# Patient Record
Sex: FEMALE | Race: WHITE | Employment: UNEMPLOYED | URBAN - METROPOLITAN AREA
[De-identification: names, ages, dates, MRNs, and addresses within clinical notes are randomized per-mention and may not be internally consistent; named-entity substitution may affect disease eponyms.]

---

## 2017-01-05 ENCOUNTER — OFFICE VISIT (OUTPATIENT)
Dept: ORTHOPEDIC SURGERY | Age: 62
End: 2017-01-05

## 2017-01-05 VITALS
HEART RATE: 89 BPM | WEIGHT: 240.08 LBS | SYSTOLIC BLOOD PRESSURE: 162 MMHG | BODY MASS INDEX: 34.45 KG/M2 | DIASTOLIC BLOOD PRESSURE: 90 MMHG

## 2017-01-05 DIAGNOSIS — M47.812 CERVICAL SPONDYLOSIS WITHOUT MYELOPATHY: Primary | ICD-10-CM

## 2017-01-05 PROCEDURE — 99213 OFFICE O/P EST LOW 20 MIN: CPT | Performed by: PHYSICAL MEDICINE & REHABILITATION

## 2017-01-05 RX ORDER — MELOXICAM 15 MG/1
15 TABLET ORAL DAILY
Qty: 30 TABLET | Refills: 3 | Status: SHIPPED | OUTPATIENT
Start: 2017-01-05 | End: 2018-10-01 | Stop reason: ALTCHOICE

## 2017-02-21 RX ORDER — GABAPENTIN 300 MG/1
CAPSULE ORAL
Qty: 360 CAPSULE | Refills: 0 | Status: SHIPPED | OUTPATIENT
Start: 2017-02-21 | End: 2017-06-29 | Stop reason: SDUPTHER

## 2017-04-07 ENCOUNTER — HOSPITAL ENCOUNTER (OUTPATIENT)
Dept: ULTRASOUND IMAGING | Age: 62
Discharge: HOME | End: 2017-04-07
Attending: INTERNAL MEDICINE | Admitting: INTERNAL MEDICINE

## 2017-04-07 DIAGNOSIS — E04.2 MULTIPLE THYROID NODULES: ICD-10-CM

## 2017-04-07 DIAGNOSIS — E04.2 NONTOXIC MULTINODULAR GOITER: ICD-10-CM

## 2017-04-18 ENCOUNTER — TELEPHONE (OUTPATIENT)
Dept: ENDOCRINOLOGY | Age: 62
End: 2017-04-18

## 2017-04-18 DIAGNOSIS — E04.2 MULTIPLE THYROID NODULES: Primary | ICD-10-CM

## 2017-05-15 ENCOUNTER — OFFICE VISIT (OUTPATIENT)
Dept: DERMATOLOGY | Age: 62
End: 2017-05-15

## 2017-05-15 DIAGNOSIS — L82.1 SEBORRHEIC KERATOSES: Primary | ICD-10-CM

## 2017-05-15 DIAGNOSIS — Z41.1 ELECTIVE PROCEDURE FOR UNACCEPTABLE COSMETIC APPEARANCE: ICD-10-CM

## 2017-05-15 DIAGNOSIS — D23.39: ICD-10-CM

## 2017-05-15 DIAGNOSIS — Z12.83 SCREENING EXAM FOR SKIN CANCER: ICD-10-CM

## 2017-05-15 PROCEDURE — 1711000 COSMETIC-DESTRUCT B9 LESION 1-14: Performed by: DERMATOLOGY

## 2017-05-15 PROCEDURE — 99203 OFFICE O/P NEW LOW 30 MIN: CPT | Performed by: DERMATOLOGY

## 2017-05-15 PROCEDURE — 1131000 COSMETIC-SHAVE SKIN LESION 0.5 CM/<: Performed by: DERMATOLOGY

## 2017-05-18 ENCOUNTER — TELEPHONE (OUTPATIENT)
Dept: DERMATOLOGY | Age: 62
End: 2017-05-18

## 2017-06-29 RX ORDER — GABAPENTIN 300 MG/1
CAPSULE ORAL
Qty: 360 CAPSULE | Refills: 0 | Status: SHIPPED | OUTPATIENT
Start: 2017-06-29 | End: 2018-10-01 | Stop reason: SDUPTHER

## 2017-07-28 ENCOUNTER — OFFICE VISIT (OUTPATIENT)
Dept: VASCULAR SURGERY | Age: 62
End: 2017-07-28

## 2017-07-28 VITALS
DIASTOLIC BLOOD PRESSURE: 84 MMHG | WEIGHT: 234 LBS | SYSTOLIC BLOOD PRESSURE: 131 MMHG | HEART RATE: 83 BPM | BODY MASS INDEX: 33.5 KG/M2 | HEIGHT: 70 IN

## 2017-07-28 DIAGNOSIS — I83.93 ASYMPTOMATIC VARICOSE VEINS, BILATERAL: Primary | ICD-10-CM

## 2017-07-28 DIAGNOSIS — I83.93 SPIDER VEINS OF BOTH LOWER EXTREMITIES: ICD-10-CM

## 2017-07-28 PROCEDURE — 99203 OFFICE O/P NEW LOW 30 MIN: CPT | Performed by: SURGERY

## 2017-07-28 ASSESSMENT — ENCOUNTER SYMPTOMS
GASTROINTESTINAL NEGATIVE: 1
ALLERGIC/IMMUNOLOGIC NEGATIVE: 1
RESPIRATORY NEGATIVE: 1
EYES NEGATIVE: 1

## 2017-10-09 RX ORDER — GABAPENTIN 300 MG/1
CAPSULE ORAL
Qty: 360 CAPSULE | Refills: 0 | Status: SHIPPED | OUTPATIENT
Start: 2017-10-09 | End: 2017-11-10 | Stop reason: SDUPTHER

## 2017-10-19 ENCOUNTER — HOSPITAL ENCOUNTER (OUTPATIENT)
Dept: ULTRASOUND IMAGING | Age: 62
Discharge: HOME | End: 2017-10-19

## 2017-10-19 DIAGNOSIS — E04.2 AUTOSOMAL DOMINANT MULTINODULAR GOITER ASSOCIATED WITH MUTATION IN DICER1 GENE: ICD-10-CM

## 2017-11-10 ENCOUNTER — OFFICE VISIT (OUTPATIENT)
Dept: ENDOCRINOLOGY | Age: 62
End: 2017-11-10

## 2017-11-10 VITALS
SYSTOLIC BLOOD PRESSURE: 134 MMHG | HEART RATE: 88 BPM | OXYGEN SATURATION: 96 % | WEIGHT: 243 LBS | HEIGHT: 70 IN | RESPIRATION RATE: 16 BRPM | BODY MASS INDEX: 34.79 KG/M2 | DIASTOLIC BLOOD PRESSURE: 92 MMHG

## 2017-11-10 DIAGNOSIS — E04.2 MULTIPLE THYROID NODULES: Primary | ICD-10-CM

## 2017-11-10 PROCEDURE — 99213 OFFICE O/P EST LOW 20 MIN: CPT | Performed by: INTERNAL MEDICINE

## 2017-11-10 RX ORDER — LANOLIN ALCOHOL/MO/W.PET/CERES
10 CREAM (GRAM) TOPICAL DAILY
COMMUNITY

## 2017-11-10 ASSESSMENT — ENCOUNTER SYMPTOMS
BACK PAIN: 0
PHOTOPHOBIA: 0
HEMOPTYSIS: 0
ORTHOPNEA: 0
DOUBLE VISION: 0
COUGH: 0
BLURRED VISION: 0

## 2017-11-10 NOTE — PROGRESS NOTES
Endocrinology  Clint Gracia M.D. Phone: [phone]   FAX: [phone]       Dhruv Gresham   YOB: 1955    Date of Visit:  11/10/2017    Allergies   Allergen Reactions    Penicillins Hives     Outpatient Prescriptions Marked as Taking for the 11/10/17 encounter (Office Visit) with Rishi Fisher MD   Medication Sig Dispense Refill    melatonin 3 MG TABS tablet Take 3 mg by mouth daily Take one half tablet nightly      gabapentin (NEURONTIN) 300 MG capsule TAKE ONE CAPSULE BY MOUTH FOUR TIMES A  capsule 0    meloxicam (MOBIC) 15 MG tablet Take 1 tablet by mouth daily 30 tablet 3    magnesium (MAGNESIUM-OXIDE) 250 MG TABS tablet Take 250 mg by mouth daily      Calcium Carbonate-Vit D-Min 2164-6021 MG-UNIT CHEW Take 1,200 mg by mouth daily. Vitals:    11/10/17 1022 11/10/17 1028   BP: (!) 152/92 (!) 134/92   Site: Right Arm Right Arm   Position: Sitting Sitting   Cuff Size: Large Adult Large Adult   Pulse: 88    Resp: 16    SpO2: 96%    Weight: 243 lb (110.2 kg)    Height: 5' 10\" (1.778 m)      Body mass index is 34.87 kg/m².      Wt Readings from Last 3 Encounters:   11/10/17 243 lb (110.2 kg)   07/28/17 234 lb (106.1 kg)   01/05/17 240 lb 1.3 oz (108.9 kg)     BP Readings from Last 3 Encounters:   11/10/17 (!) 134/92   07/28/17 131/84   01/05/17 (!) 162/90        Past Medical History:   Diagnosis Date    Arthritis     Hip pain     Hypertension     IBS (irritable bowel syndrome)     Neuropathy (HCC)     Pulmonary embolism (HCC)     Recurrent low back pain     Thyroid disease     enlarged     Past Surgical History:   Procedure Laterality Date    COLONOSCOPY  10/26/2016    Polyps    FOOT SURGERY Left      Family History   Problem Relation Age of Onset    Cancer Father     Heart Disease Sister     Other Sister      Pulmonary Embolism     History   Smoking Status    Never Smoker   Smokeless Tobacco    Never Used      History   Alcohol Use No MARIE Barrett is a 64 y.o. female who is here for a follow-up for management of thyroid nodule  PCP :  Bee Handy MD     Patient has a PMH of hypertension, PE, back pain, neuropathy. She had an MRI in 01/16 which showed thyroid nodules. Patient had Thyroid US done in 02/16 which showed the thyroid gland to be heterogenous with a nodule in left upper lobe. No obstructive symptoms      Mother had hypothyroidism. No FH of thyroid cancer  No History of exposure to radiation as a child. Review of Systems   Constitutional: Negative for chills, diaphoresis, fever, malaise/fatigue and weight loss. Eyes: Negative for blurred vision, double vision and photophobia. Respiratory: Negative for cough and hemoptysis. Cardiovascular: Negative for chest pain, palpitations and orthopnea. Genitourinary: Negative for dysuria, flank pain, frequency, hematuria and urgency. Musculoskeletal: Negative for back pain, falls, joint pain, myalgias and neck pain. Skin: Negative for itching and rash. Neurological: Negative for dizziness, tingling, tremors, sensory change, speech change, focal weakness, seizures, loss of consciousness and headaches. Endo/Heme/Allergies: Negative for environmental allergies and polydipsia. Does not bruise/bleed easily. Psychiatric/Behavioral: Negative for depression, hallucinations, memory loss, substance abuse and suicidal ideas. The patient is not nervous/anxious and does not have insomnia. Physical Exam   Constitutional: She is oriented to person, place, and time. She appears well-developed. No distress. HENT:   Mouth/Throat: Oropharynx is clear and moist.   Eyes: EOM are normal.   Neck: Thyromegaly present. Cardiovascular: Normal rate and normal heart sounds. Pulmonary/Chest: Effort normal. No respiratory distress. She has no wheezes. Abdominal: Soft. Bowel sounds are normal. There is no tenderness.    Musculoskeletal: She exhibits Impression   Enlarged heterogeneous thyroid.  Few slightly more focal nodular appearing   areas are seen within both lobes of thyroid which are overall similar in   appearance compared to prior.           Assessment/Plan    1. Thyroid nodule    This 64 yrs old female was found to have a 1.7 cm nodule in left lobe. Reviewed US images. Both lobes heterogenous. Cystic nodule 6-7 mm in right lobe. Left lobe showed 1.7 cm nodule    S/p FNA in 04/16 which showed the nodules to be benign hyperplastic nodule    US in 04/17 showed a left sided nodule arising from lower pole ( 3.5 Cm)    Most recent [address] in 10/17 did not show the large nodule seen previously. Nodules are stable. Will continue to monitor the nodules for now     TSH, Free T4 and antibodies were normal in 02/16     Repeat US in 12 months       2. PE On coumadin. 3. High BP . Previously normal. Advised to follow-up with her PCP.

## 2018-10-01 ENCOUNTER — OFFICE VISIT (OUTPATIENT)
Dept: FAMILY MEDICINE CLINIC | Age: 63
End: 2018-10-01

## 2018-10-01 VITALS
DIASTOLIC BLOOD PRESSURE: 82 MMHG | BODY MASS INDEX: 33 KG/M2 | WEIGHT: 230 LBS | SYSTOLIC BLOOD PRESSURE: 130 MMHG | OXYGEN SATURATION: 98 % | HEART RATE: 77 BPM

## 2018-10-01 DIAGNOSIS — M50.920 CERVICAL DISC DISORDER OF MID-CERVICAL REGION: ICD-10-CM

## 2018-10-01 DIAGNOSIS — Z13.220 SCREENING FOR LIPID DISORDERS: ICD-10-CM

## 2018-10-01 DIAGNOSIS — Z86.711 HISTORY OF PULMONARY EMBOLISM: ICD-10-CM

## 2018-10-01 DIAGNOSIS — Z11.59 ENCOUNTER FOR HEPATITIS C SCREENING TEST FOR LOW RISK PATIENT: ICD-10-CM

## 2018-10-01 DIAGNOSIS — G62.9 POLYNEUROPATHY: Primary | ICD-10-CM

## 2018-10-01 DIAGNOSIS — H93.13 TINNITUS, BILATERAL: ICD-10-CM

## 2018-10-01 DIAGNOSIS — Z23 NEED FOR INFLUENZA VACCINATION: ICD-10-CM

## 2018-10-01 LAB
A/G RATIO: 1.6 (ref 1.1–2.2)
ALBUMIN SERPL-MCNC: 4.3 G/DL (ref 3.4–5)
ALP BLD-CCNC: 78 U/L (ref 40–129)
ALT SERPL-CCNC: 18 U/L (ref 10–40)
ANION GAP SERPL CALCULATED.3IONS-SCNC: 14 MMOL/L (ref 3–16)
AST SERPL-CCNC: 20 U/L (ref 15–37)
BILIRUB SERPL-MCNC: <0.2 MG/DL (ref 0–1)
BUN BLDV-MCNC: 17 MG/DL (ref 7–20)
CALCIUM SERPL-MCNC: 9.7 MG/DL (ref 8.3–10.6)
CHLORIDE BLD-SCNC: 104 MMOL/L (ref 99–110)
CHOLESTEROL, TOTAL: 200 MG/DL (ref 0–199)
CO2: 24 MMOL/L (ref 21–32)
CREAT SERPL-MCNC: 0.6 MG/DL (ref 0.6–1.2)
GFR AFRICAN AMERICAN: >60
GFR NON-AFRICAN AMERICAN: >60
GLOBULIN: 2.7 G/DL
GLUCOSE BLD-MCNC: 90 MG/DL (ref 70–99)
HDLC SERPL-MCNC: 65 MG/DL (ref 40–60)
LDL CHOLESTEROL CALCULATED: 113 MG/DL
POTASSIUM SERPL-SCNC: 4.7 MMOL/L (ref 3.5–5.1)
SODIUM BLD-SCNC: 142 MMOL/L (ref 136–145)
TOTAL PROTEIN: 7 G/DL (ref 6.4–8.2)
TRIGL SERPL-MCNC: 108 MG/DL (ref 0–150)
VLDLC SERPL CALC-MCNC: 22 MG/DL

## 2018-10-01 PROCEDURE — 90471 IMMUNIZATION ADMIN: CPT | Performed by: FAMILY MEDICINE

## 2018-10-01 PROCEDURE — 99214 OFFICE O/P EST MOD 30 MIN: CPT | Performed by: FAMILY MEDICINE

## 2018-10-01 PROCEDURE — 90686 IIV4 VACC NO PRSV 0.5 ML IM: CPT | Performed by: FAMILY MEDICINE

## 2018-10-01 RX ORDER — GABAPENTIN 300 MG/1
CAPSULE ORAL
Qty: 360 CAPSULE | Refills: 1 | Status: SHIPPED | OUTPATIENT
Start: 2018-10-01 | End: 2019-04-08 | Stop reason: SDUPTHER

## 2018-10-01 NOTE — PROGRESS NOTES
Outpatient Prescriptions   Medication Sig Dispense Refill    gabapentin (NEURONTIN) 300 MG capsule TAKE ONE CAPSULE BY MOUTH FOUR TIMES A DAY. 360 capsule 1    melatonin 3 MG TABS tablet Take 3 mg by mouth daily Take one half tablet nightly      magnesium (MAGNESIUM-OXIDE) 250 MG TABS tablet Take 250 mg by mouth daily      Calcium Carbonate-Vit D-Min 0370-0414 MG-UNIT CHEW Take 1,200 mg by mouth daily. No current facility-administered medications for this visit. Assessment:    1. Polyneuropathy    2. History of pulmonary embolism    3. Cervical disc disorder of mid-cervical region    4. Tinnitus, bilateral    5. Need for influenza vaccination    6. Encounter for hepatitis C screening test for low risk patient    7. Screening for lipid disorders        Plan:    1. Polyneuropathy  Stable. Continue current medications. - gabapentin (NEURONTIN) 300 MG capsule; TAKE ONE CAPSULE BY MOUTH FOUR TIMES A DAY. Dispense: 360 capsule; Refill: 1  - Comprehensive Metabolic Panel    2. History of pulmonary embolism  Recommend taking aspirin. 3. Cervical disc disorder of mid-cervical region  Take Meloxicam prn pain. 4. Tinnitus, bilateral  Unclear etiology. Ears looked normal. Recommend sudafed and ENT referral if desired in the future. 5. Need for influenza vaccination  - INFLUENZA, QUADV, 3 YRS AND OLDER, IM, PF, PREFILL SYR OR SDV, 0.5ML (FLUZONE QUADV, PF)    6. Encounter for hepatitis C screening test for low risk patient  - Hepatitis C Antibody    7. Screening for lipid disorders  - Lipid Panel      Return in about 6 months (around 4/1/2019) for Preventative.

## 2018-10-02 LAB — HEPATITIS C ANTIBODY INTERPRETATION: NORMAL

## 2018-10-05 ENCOUNTER — HOSPITAL ENCOUNTER (OUTPATIENT)
Dept: ULTRASOUND IMAGING | Age: 63
Discharge: HOME | End: 2018-10-05
Admitting: INTERNAL MEDICINE

## 2018-10-05 DIAGNOSIS — E04.2 MULTIPLE THYROID NODULES: ICD-10-CM

## 2018-10-05 PROCEDURE — 76536 US EXAM OF HEAD AND NECK: CPT

## 2018-10-16 DIAGNOSIS — E04.2 MULTIPLE THYROID NODULES: Primary | ICD-10-CM

## 2018-10-30 ENCOUNTER — TELEPHONE (OUTPATIENT)
Dept: FAMILY MEDICINE CLINIC | Age: 63
End: 2018-10-30

## 2018-10-30 DIAGNOSIS — Z23 NEED FOR HEPATITIS A VACCINATION: Primary | ICD-10-CM

## 2018-10-31 ENCOUNTER — NURSE ONLY (OUTPATIENT)
Dept: FAMILY MEDICINE CLINIC | Age: 63
End: 2018-10-31

## 2018-10-31 DIAGNOSIS — Z23 NEED FOR HEPATITIS A IMMUNIZATION: ICD-10-CM

## 2018-10-31 PROCEDURE — 90632 HEPA VACCINE ADULT IM: CPT | Performed by: FAMILY MEDICINE

## 2018-10-31 PROCEDURE — 90471 IMMUNIZATION ADMIN: CPT | Performed by: FAMILY MEDICINE

## 2018-12-02 ENCOUNTER — PATIENT MESSAGE (OUTPATIENT)
Dept: OTHER | Facility: CLINIC | Age: 63
End: 2018-12-02

## 2018-12-05 ENCOUNTER — PROCEDURE VISIT (OUTPATIENT)
Dept: VASCULAR SURGERY | Age: 63
End: 2018-12-05

## 2018-12-05 ENCOUNTER — OFFICE VISIT (OUTPATIENT)
Dept: VASCULAR SURGERY | Age: 63
End: 2018-12-05

## 2018-12-05 VITALS
BODY MASS INDEX: 32.93 KG/M2 | DIASTOLIC BLOOD PRESSURE: 85 MMHG | SYSTOLIC BLOOD PRESSURE: 128 MMHG | HEIGHT: 70 IN | WEIGHT: 230 LBS | HEART RATE: 82 BPM

## 2018-12-05 DIAGNOSIS — I82.491 DEEP VEIN THROMBOSIS (DVT) OF OTHER VEIN OF RIGHT LOWER EXTREMITY, UNSPECIFIED CHRONICITY (HCC): Primary | ICD-10-CM

## 2018-12-05 DIAGNOSIS — M79.604 PAIN OF RIGHT LOWER EXTREMITY: Primary | ICD-10-CM

## 2018-12-05 PROCEDURE — 99214 OFFICE O/P EST MOD 30 MIN: CPT | Performed by: SURGERY

## 2018-12-05 PROCEDURE — 93971 EXTREMITY STUDY: CPT | Performed by: SURGERY

## 2019-04-08 DIAGNOSIS — G62.9 POLYNEUROPATHY: ICD-10-CM

## 2019-04-08 RX ORDER — GABAPENTIN 300 MG/1
CAPSULE ORAL
Qty: 360 CAPSULE | Refills: 1 | Status: SHIPPED | OUTPATIENT
Start: 2019-04-08 | End: 2019-04-09 | Stop reason: SDUPTHER

## 2019-04-09 ENCOUNTER — TELEPHONE (OUTPATIENT)
Dept: FAMILY MEDICINE CLINIC | Age: 64
End: 2019-04-09

## 2019-04-09 DIAGNOSIS — G62.9 POLYNEUROPATHY: ICD-10-CM

## 2019-04-09 RX ORDER — GABAPENTIN 400 MG/1
CAPSULE ORAL
Qty: 360 CAPSULE | Refills: 1 | Status: SHIPPED | OUTPATIENT
Start: 2019-04-09 | End: 2019-05-14

## 2019-04-09 NOTE — TELEPHONE ENCOUNTER
Pharm only has 100 mg and 400 mg of Gabapentin for patients without RX coverage on insurance. You wrote for 300. If you can do the 100 mg or 400 mg dosing, pt will get it free.

## 2019-05-14 ENCOUNTER — OFFICE VISIT (OUTPATIENT)
Dept: FAMILY MEDICINE CLINIC | Age: 64
End: 2019-05-14

## 2019-05-14 VITALS
TEMPERATURE: 97.6 F | BODY MASS INDEX: 35.15 KG/M2 | WEIGHT: 245 LBS | HEART RATE: 96 BPM | SYSTOLIC BLOOD PRESSURE: 132 MMHG | RESPIRATION RATE: 18 BRPM | DIASTOLIC BLOOD PRESSURE: 84 MMHG | OXYGEN SATURATION: 98 %

## 2019-05-14 DIAGNOSIS — G62.9 POLYNEUROPATHY: Primary | ICD-10-CM

## 2019-05-14 DIAGNOSIS — Z86.711 HISTORY OF PULMONARY EMBOLISM: ICD-10-CM

## 2019-05-14 PROCEDURE — 99213 OFFICE O/P EST LOW 20 MIN: CPT | Performed by: FAMILY MEDICINE

## 2019-05-14 RX ORDER — GABAPENTIN 300 MG/1
300 CAPSULE ORAL 4 TIMES DAILY PRN
Qty: 360 CAPSULE | Refills: 3 | Status: SHIPPED | OUTPATIENT
Start: 2019-05-14 | End: 2019-10-11

## 2019-05-14 RX ORDER — GABAPENTIN 300 MG/1
300 CAPSULE ORAL 4 TIMES DAILY PRN
Qty: 360 CAPSULE | Refills: 1 | Status: SHIPPED | OUTPATIENT
Start: 2019-05-14 | End: 2019-05-14 | Stop reason: SDUPTHER

## 2019-05-14 ASSESSMENT — PATIENT HEALTH QUESTIONNAIRE - PHQ9
1. LITTLE INTEREST OR PLEASURE IN DOING THINGS: 0
SUM OF ALL RESPONSES TO PHQ9 QUESTIONS 1 & 2: 0
2. FEELING DOWN, DEPRESSED OR HOPELESS: 0
SUM OF ALL RESPONSES TO PHQ QUESTIONS 1-9: 0
SUM OF ALL RESPONSES TO PHQ QUESTIONS 1-9: 0

## 2019-05-14 NOTE — PROGRESS NOTES
Elida Nieto is a 61 y.o. female    Chief Complaint   Patient presents with    Peripheral Neuropathy       HPI:    HPI    Patient has a history of polyneuropathy. This is a chronic issue. EMG showed mild sensorimotor neuropathy. She continues to complain of difficulty with balance but no falling.  No new weakness.  No worsening of her chronic numbness. Gabapentin does help with her symptoms. History of pulmonary embolism. It was 6 years ago and patient was on Coumadin for 4 years. Does not take aspirin daily. Patient does not have a sedentary lifestyle. It was thought for her to have factor 5 leiden deficiency. Previous PE was during an illness when she was sedentary. ROS:    Review of Systems    /84   Pulse 96   Temp 97.6 °F (36.4 °C) (Oral)   Resp 18   Wt 245 lb (111.1 kg)   SpO2 98%   BMI 35.15 kg/m²     Physical Exam:    Physical Exam   Constitutional: She appears well-developed. No distress. Skin: She is not diaphoretic. Psychiatric: She has a normal mood and affect. Her behavior is normal.       Current Outpatient Medications   Medication Sig Dispense Refill    gabapentin (NEURONTIN) 300 MG capsule Take 1 capsule by mouth 4 times daily as needed (pain) for up to 180 days. 360 capsule 1    aspirin 81 MG tablet Take 81 mg by mouth daily      melatonin 3 MG TABS tablet Take 10 mg by mouth daily Take one half tablet nightly       magnesium (MAGNESIUM-OXIDE) 250 MG TABS tablet Take 250 mg by mouth daily      Calcium Carbonate-Vit D-Min 4124-6963 MG-UNIT CHEW Take 1,200 mg by mouth daily. No current facility-administered medications for this visit. Assessment:    1. Polyneuropathy    2. History of pulmonary embolism        Plan:    1. Polyneuropathy  Stable. Continue current medications. - gabapentin (NEURONTIN) 300 MG capsule; Take 1 capsule by mouth 4 times daily as needed (pain) for up to 180 days. Dispense: 360 capsule; Refill: 1    2.  History of pulmonary embolism  Continue aspirin. Return in about 6 months (around 11/14/2019) for polyneuropathy.

## 2019-05-23 ENCOUNTER — TELEPHONE (OUTPATIENT)
Dept: FAMILY MEDICINE CLINIC | Age: 64
End: 2019-05-23

## 2019-05-23 NOTE — TELEPHONE ENCOUNTER
Pt states there is an application to [employer]. . That needs to be faxed in. .. That needs to include the shingles vaccine. Page 2 was not faxed in correctly. Please advise.

## 2019-05-24 ENCOUNTER — TELEPHONE (OUTPATIENT)
Dept: FAMILY MEDICINE CLINIC | Age: 64
End: 2019-05-24

## 2019-06-07 DIAGNOSIS — Z23 NEED FOR SHINGLES VACCINE: Primary | ICD-10-CM

## 2019-06-24 ENCOUNTER — TELEPHONE (OUTPATIENT)
Dept: FAMILY MEDICINE CLINIC | Age: 64
End: 2019-06-24

## 2019-07-05 NOTE — TELEPHONE ENCOUNTER
Pt calling to speak to [employer] [facility]. Pt referred to a form from [address] [place] [address] it being something that the [facility] is requiring. She said the form was faxed at the beginning of the week. [facility] return call to Ms. Aplpe when you return to the office.

## 2019-07-31 ENCOUNTER — TELEPHONE (OUTPATIENT)
Dept: ADMINISTRATIVE | Age: 64
End: 2019-07-31

## 2019-10-11 ENCOUNTER — OFFICE VISIT (OUTPATIENT)
Dept: FAMILY MEDICINE CLINIC | Age: 64
End: 2019-10-11

## 2019-10-11 VITALS
SYSTOLIC BLOOD PRESSURE: 130 MMHG | DIASTOLIC BLOOD PRESSURE: 80 MMHG | HEART RATE: 77 BPM | WEIGHT: 218 LBS | OXYGEN SATURATION: 98 % | BODY MASS INDEX: 31.28 KG/M2

## 2019-10-11 DIAGNOSIS — Z23 NEED FOR IMMUNIZATION AGAINST INFLUENZA: ICD-10-CM

## 2019-10-11 DIAGNOSIS — G62.9 POLYNEUROPATHY: ICD-10-CM

## 2019-10-11 DIAGNOSIS — I10 ESSENTIAL HYPERTENSION: ICD-10-CM

## 2019-10-11 DIAGNOSIS — Z76.89 ENCOUNTER TO ESTABLISH CARE: Primary | ICD-10-CM

## 2019-10-11 DIAGNOSIS — Z00.00 HEALTHCARE MAINTENANCE: ICD-10-CM

## 2019-10-11 PROCEDURE — 99214 OFFICE O/P EST MOD 30 MIN: CPT | Performed by: FAMILY MEDICINE

## 2019-10-11 PROCEDURE — 90471 IMMUNIZATION ADMIN: CPT | Performed by: FAMILY MEDICINE

## 2019-10-11 PROCEDURE — 90686 IIV4 VACC NO PRSV 0.5 ML IM: CPT | Performed by: FAMILY MEDICINE

## 2019-10-11 RX ORDER — GABAPENTIN 600 MG/1
600 TABLET ORAL 4 TIMES DAILY PRN
Qty: 120 TABLET | Refills: 5 | Status: SHIPPED | OUTPATIENT
Start: 2019-10-11 | End: 2021-05-27

## 2019-10-11 ASSESSMENT — ENCOUNTER SYMPTOMS
TROUBLE SWALLOWING: 0
BLOOD IN STOOL: 0
BACK PAIN: 0
NAUSEA: 0
VOMITING: 0
ABDOMINAL PAIN: 0
DIARRHEA: 0
COUGH: 0
SHORTNESS OF BREATH: 0
COLOR CHANGE: 0
CONSTIPATION: 0

## 2019-10-18 ENCOUNTER — HOSPITAL ENCOUNTER (OUTPATIENT)
Dept: ULTRASOUND IMAGING | Age: 64
Discharge: HOME | End: 2019-10-18

## 2019-10-18 DIAGNOSIS — E04.2 MULTIPLE THYROID NODULES: ICD-10-CM

## 2019-10-18 PROCEDURE — 76536 US EXAM OF HEAD AND NECK: CPT

## 2019-11-01 ENCOUNTER — OFFICE VISIT (OUTPATIENT)
Dept: ENDOCRINOLOGY | Age: 64
End: 2019-11-01

## 2019-11-01 ENCOUNTER — HOSPITAL ENCOUNTER (OUTPATIENT)
Age: 64
Discharge: HOME | End: 2019-11-01

## 2019-11-01 VITALS
OXYGEN SATURATION: 100 % | BODY MASS INDEX: 32.04 KG/M2 | HEIGHT: 70 IN | DIASTOLIC BLOOD PRESSURE: 86 MMHG | HEART RATE: 80 BPM | WEIGHT: 223.8 LBS | SYSTOLIC BLOOD PRESSURE: 146 MMHG

## 2019-11-01 DIAGNOSIS — E04.2 MULTIPLE THYROID NODULES: Primary | ICD-10-CM

## 2019-11-01 DIAGNOSIS — E04.1 LEFT THYROID NODULE: ICD-10-CM

## 2019-11-01 DIAGNOSIS — E04.2 MULTIPLE THYROID NODULES: ICD-10-CM

## 2019-11-01 LAB
T4 FREE: 1.2 NG/DL (ref 0.9–1.8)
TSH SERPL DL<=0.05 MIU/L-ACNC: 1.65 UIU/ML (ref 0.27–4.2)

## 2019-11-01 PROCEDURE — 99214 OFFICE O/P EST MOD 30 MIN: CPT | Performed by: INTERNAL MEDICINE

## 2019-11-01 PROCEDURE — 84439 ASSAY OF FREE THYROXINE: CPT

## 2019-11-01 PROCEDURE — 84443 ASSAY THYROID STIM HORMONE: CPT

## 2019-11-01 PROCEDURE — 36415 COLL VENOUS BLD VENIPUNCTURE: CPT

## 2019-11-01 ASSESSMENT — ENCOUNTER SYMPTOMS
DOUBLE VISION: 0
COUGH: 0
HEMOPTYSIS: 0
ORTHOPNEA: 0
BACK PAIN: 0
PHOTOPHOBIA: 0
BLURRED VISION: 0

## 2019-11-11 ENCOUNTER — TELEPHONE (OUTPATIENT)
Dept: ENDOCRINOLOGY | Age: 64
End: 2019-11-11

## 2019-11-21 ENCOUNTER — HOSPITAL ENCOUNTER (OUTPATIENT)
Dept: ULTRASOUND IMAGING | Age: 64
Discharge: HOME | End: 2019-11-21

## 2019-11-21 VITALS
HEART RATE: 71 BPM | DIASTOLIC BLOOD PRESSURE: 84 MMHG | RESPIRATION RATE: 15 BRPM | SYSTOLIC BLOOD PRESSURE: 153 MMHG | OXYGEN SATURATION: 98 %

## 2019-11-21 DIAGNOSIS — E04.1 LEFT THYROID NODULE: ICD-10-CM

## 2019-11-21 PROCEDURE — 10005 FNA BX W/US GDN 1ST LES: CPT

## 2019-11-21 PROCEDURE — 88305 TISSUE EXAM BY PATHOLOGIST: CPT

## 2019-11-21 PROCEDURE — 88173 CYTOPATH EVAL FNA REPORT: CPT

## 2020-06-08 ENCOUNTER — OFFICE VISIT (OUTPATIENT)
Dept: ORTHOPEDIC SURGERY | Age: 65
End: 2020-06-08

## 2020-06-08 VITALS — WEIGHT: 223.77 LBS | HEIGHT: 70 IN | BODY MASS INDEX: 32.04 KG/M2

## 2020-06-08 PROCEDURE — 99203 OFFICE O/P NEW LOW 30 MIN: CPT | Performed by: PHYSICAL MEDICINE & REHABILITATION

## 2020-06-25 ENCOUNTER — TELEPHONE (OUTPATIENT)
Dept: ENDOCRINOLOGY | Age: 65
End: 2020-06-25

## 2020-07-07 ENCOUNTER — VIRTUAL VISIT (OUTPATIENT)
Dept: ENDOCRINOLOGY | Age: 65
End: 2020-07-07

## 2020-07-07 PROCEDURE — 99214 OFFICE O/P EST MOD 30 MIN: CPT | Performed by: INTERNAL MEDICINE

## 2020-07-07 ASSESSMENT — ENCOUNTER SYMPTOMS
COUGH: 0
DOUBLE VISION: 0
BLURRED VISION: 0
BACK PAIN: 0
ORTHOPNEA: 0
PHOTOPHOBIA: 0
HEMOPTYSIS: 0

## 2020-07-07 NOTE — PROGRESS NOTES
Endocrinology  Ciera Greene M.D. Phone: [phone]   FAX: [phone]       Vinod Babin   YOB: 1955    Date of Visit:  7/7/2020    Allergies   Allergen Reactions    Penicillins Hives     Outpatient Medications Marked as Taking for the 7/7/20 encounter (Virtual Visit) with Faheem Deluca MD   Medication Sig Dispense Refill    aspirin 81 MG tablet Take 81 mg by mouth daily      melatonin 3 MG TABS tablet Take 10 mg by mouth daily Take one half tablet nightly       magnesium (MAGNESIUM-OXIDE) 250 MG TABS tablet Take 250 mg by mouth daily      Calcium Carbonate-Vit D-Min 5198-9824 MG-UNIT CHEW Take 600 mg by mouth daily            There were no vitals filed for this visit. There is no height or weight on file to calculate BMI.      Wt Readings from Last 3 Encounters:   06/08/20 223 lb 12.3 oz (101.5 kg)   11/01/19 223 lb 12.8 oz (101.5 kg)   10/11/19 218 lb (98.9 kg)     BP Readings from Last 3 Encounters:   11/21/19 (!) 153/84   11/01/19 (!) 146/86   10/11/19 130/80        Past Medical History:   Diagnosis Date    Arthritis     Hip pain     Hypertension     IBS (irritable bowel syndrome)     Neuropathy     Pulmonary embolism (HCC)     Recurrent low back pain     Thyroid disease     enlarged     Past Surgical History:   Procedure Laterality Date    COLONOSCOPY  10/26/2016    Polyps    FOOT SURGERY Left      Family History   Problem Relation Age of Onset    Cancer Father     Heart Disease Sister     Other Sister         Pulmonary Embolism     Social History     Tobacco Use   Smoking Status Never Smoker   Smokeless Tobacco Never Used      Social History     Substance and Sexual Activity   Alcohol Use No       HPI    Vinod Babin is a 59 y.o. female who is here for a follow-up for management of thyroid nodule  PCP :  Shonda Alexander MD       Due to the COVID-19 restrictions on close contact interactions the patient's visit was conducted via video link  ( [email or web address]) in lieu of a face to face visit. Location for patient : home  Physician : home    Pursuant to the emergency declaration under the [facility], [address] [place] [address] and the [employer] Act, this Virtual  Visit was conducted, with patient's consent, to reduce the patient's risk of exposure to COVID-19 and provide necessary care. Because this was a Virtual Visit, evaluation of the following organ systems was limited: Vitals, Constitutional, EENT, Resp, CV, GI, , MS, Neuro, Skin. Heme. Lymph, Imm. Patient is aware that that he/she may receive a bill for this service, depending on her insurance coverage, and has provided verbal consent to proceed. Physical exam in today's note represents findings from the last actual visit      Patient has a PMH of hypertension, PE, back pain, neuropathy. She had an MRI in 01/16 which showed thyroid nodules. Patient had Thyroid US done in 02/16 which showed the thyroid gland to be heterogenous with a nodule in left upper lobe. No obstructive symptoms      Mother had hypothyroidism. No FH of thyroid cancer  No History of exposure to radiation as a child. Review of Systems   Constitutional: Negative for chills, diaphoresis, fever, malaise/fatigue and weight loss. Eyes: Negative for blurred vision, double vision and photophobia. Respiratory: Negative for cough and hemoptysis. Cardiovascular: Negative for chest pain, palpitations and orthopnea. Genitourinary: Negative for dysuria, flank pain, frequency, hematuria and urgency. Musculoskeletal: Negative for back pain, falls, joint pain, myalgias and neck pain. Skin: Negative for itching and rash. Neurological: Negative for dizziness, tingling, tremors, sensory change, speech change, focal weakness, seizures, loss of consciousness and headaches.    Endo/Heme/Allergies: Negative for environmental allergies and polydipsia. Does not bruise/bleed easily. Psychiatric/Behavioral: Negative for depression, hallucinations, memory loss, substance abuse and suicidal ideas. The patient is not nervous/anxious and does not have insomnia. THYROID ULTRASOUND      2/5/2016      COMPARISON:   MRI 01/15/2016      HISTORY:   Thyroid nodule      FINDINGS:   Right thyroid lobe: 4.9 x 2.3 x 1.9 cm      Left thyroid lobe: 9.4 x 3.8 x 3 cm      Isthmus: 4.6 mm      Thyroid Gland: Thyroid is diffusely enlarged and lobulated in appearance. Nodules: There is a 1.7 cm heterogeneous isoechoic solid nodule with small   cystic components on the left. Cervical lymphadenopathy: No abnormal lymph nodes in the imaged portions of   the neck. Impression   IMPRESSION:   Heterogeneous enlarged thyroid with a focal subtle nodule on the left. EXAMINATION:   THYROID ULTRASOUND       10/19/2017       COMPARISON:   Thyroid ultrasound April 7, 2017 and February 5, 2016.       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 Vascularity is normal.       Nodules:  There appears to be a 2 cm area nodularity within the left thyroid   which is poorly evaluated due to the marked heterogeneity.  This is seen on   the longitudinal view only and is not well visualized on transverse images   and may be artifactual due to the nodularity of the thyroid.  This is overall   similar in appearance compared to prior study.  A 1.1 cm hypoechoic nodule is   seen within the posterior right thyroid.  Several other smaller scattered   areas of nodularity are seen within the thyroid which are subcentimeter.       Cervical lymphadenopathy: No abnormal lymph nodes in the imaged portions of   the neck.           Impression   Enlarged heterogeneous thyroid.  Few slightly more focal nodular appearing   areas are seen within both lobes of thyroid which are overall similar in   appearance compared to prior.         EXAMINATION:   THYROID ULTRASOUND       10/18/2019       COMPARISON:   10/05/2018       HISTORY:   ORDERING SYSTEM PROVIDED HISTORY: Multiple thyroid nodules   TECHNOLOGIST PROVIDED HISTORY:   Reason for exam:->h/o thyroid nodules.  US to be done in 10/19.       Patient reports benign left thyroid biopsy 2 years ago, no record available   in our system.       FINDINGS:   Right thyroid lobe:  4.6 x 3.2 x 2.2 cm       Left thyroid lobe:  6.6 x 4.4 x 3.9 cm       Isthmus:  0.4 cm       Thyroid Gland:  Thyroid gland is inhomogeneous.       Nodules: Thyroid nodules are noted.       NODULE: Left 1       Size: 31 x 32 x 51 mm       Location: Mid/inferior       1.  Composition:  Almost completely solid (2)       2.  Echogenicity:  Isoechoic (1)       3.  Shape:  Wider than tall (0)       4.  Margins:  Smooth (0)       5. Echogenic foci:  Macrocalcifications (1)       ACR TI-RADS total points:  4       ACR TI-RADS risk category: TR 4       No record of prior biopsy available.  Increased in size (previously 43 x 30 x   36 mm).       Recommendation:  Ultrasound-guided biopsy       NODULE: Right 1       Size: 6 x 4 x 7 mm       Location: Superior       1.  Composition:  Almost completely solid (2)       2.  Echogenicity:  Hypoechoic (2)       3.  Shape:  Wider than tall (0)       4.  Margins:  Smooth (0)       5. Echogenic foci:  None (0)       ACR TI-RADS total points:  4       ACR TI-RADS risk category: TR 4       Recommendation:  No follow-up necessary       NODULE: Right 2       Size: 6 x 5 x 7 mm       Location: Mid       1.  Composition:  Solid (2)       2.  Echogenicity:  Hypoechoic (2)       3.  Shape:  Wider than tall (0)       4.  Margins:  Smooth (0)       5. Echogenic foci:  None (0)       ACR TI-RADS total points:  4       ACR TI-RADS risk category: TR 4       Recommendation:  No follow-up necessary       Cervical lymphadenopathy: No abnormal lymph nodes in the imaged portions of   the neck.           Impression   Interval growth of dominant nodule (left 1).  Fine-needle aspiration is   recommended.       No follow-up is recommended of additional, likely benign thyroid nodules. Assessment/Plan    1. Thyroid nodule    This 59 yrs old female was found to have a 1.7 cm nodule in left lobe. Both lobes heterogenous. Cystic nodule 6-7 mm in right lobe. Left lobe showed 1.7 cm nodule    S/p FNA in 04/16 which showed the nodules to be benign hyperplastic nodule    Nodule has been growing in size over last 3-4 yrs. US in 10/19 showed left lobe nodule has increased in size    Right lobe Nodules are stable. -FNA of  the dominant left lobe nodule was done given increase in size. It showed the nodule to be benign again on FNA in 11/19     She is having difficulty swallowing and wants to consider surgery. Given increase in size of nodule and obstructive symptoms, left lobectomy is an option . Will refer to ENT, Dr. Kitty Rviero in [facility]. 2. PE On coumadin.

## 2020-07-09 ENCOUNTER — OFFICE VISIT (OUTPATIENT)
Dept: ENT CLINIC | Age: 65
End: 2020-07-09

## 2020-07-09 VITALS
HEART RATE: 75 BPM | TEMPERATURE: 97.2 F | SYSTOLIC BLOOD PRESSURE: 137 MMHG | WEIGHT: 233 LBS | BODY MASS INDEX: 33.36 KG/M2 | HEIGHT: 70 IN | DIASTOLIC BLOOD PRESSURE: 88 MMHG

## 2020-07-09 PROCEDURE — 99204 OFFICE O/P NEW MOD 45 MIN: CPT | Performed by: OTOLARYNGOLOGY

## 2020-07-09 ASSESSMENT — ENCOUNTER SYMPTOMS
COUGH: 0
VOICE CHANGE: 0
SORE THROAT: 0
SHORTNESS OF BREATH: 0
FACIAL SWELLING: 0
APNEA: 0
EYE ITCHING: 0
TROUBLE SWALLOWING: 1
SINUS PRESSURE: 0

## 2020-07-09 NOTE — PROGRESS NOTES
[address], [address]  [place], [address] [place] [address]  P: [phone]       Patient     So Young  1955    ChiefComplaint     Chief Complaint   Patient presents with    Thyroid Problem     States that her endo ref her here due to two thyroid biopsies coming back abnormal. States that she is having trouble swallowing food,liquids and medications. States that endo believes it is time for thyroid to be removed. States symptoms have gotten worse over the last year.  Other     States that she has a clotting disorder, states that factor 5 runs in her family, but she has not been tested for which factor. States that she used to take Coumadin. History of Present Illness     Anais Jones is a 77-year-old female present today with her  for evaluation of dysphasia and goiter. She has been following with Dr. Giselle Sykes since 2016 for thyroid goiter and nodule. She has had 2 biopsies on the dominant left thyroid nodule both of which come back as benign. Her thyroid in 2016 was approximately 9 cm and now 6.6 cm in greatest dimension. She reports for the last 6 months increasing difficulty swallowing dry breads and large pills. She has no difficulty swallowing aspirin meats or raw vegetables. She is concerned that her thyroid is compressing her esophagus and causing difficulty swallowing. Denies shortness of breath when lying flat. No family history of thyroid cancer.     Past Medical History     Past Medical History:   Diagnosis Date    Allergic rhinitis     Arthritis     Dizziness     Hip pain     Hypertension     IBS (irritable bowel syndrome)     Neuropathy     Pulmonary embolism (HCC)     Recurrent low back pain     Thyroid disease     enlarged    Tinnitus        Past Surgical History     Past Surgical History:   Procedure Laterality Date    COLONOSCOPY  10/26/2016    Polyps    FOOT SURGERY Left     TONSILLECTOMY         Family History     Family History   Problem Relation Age of Onset    Cancer Father     Heart Disease Sister     Other Sister         Pulmonary Embolism       Social History     Social History     Tobacco Use    Smoking status: Never Smoker    Smokeless tobacco: Never Used   Substance Use Topics    Alcohol use: No    Drug use: No        Allergies     Allergies   Allergen Reactions    Penicillins Hives       Medications     Current Outpatient Medications   Medication Sig Dispense Refill    aspirin 81 MG tablet Take 81 mg by mouth daily      melatonin 3 MG TABS tablet Take 10 mg by mouth daily Take one half tablet nightly       magnesium (MAGNESIUM-OXIDE) 250 MG TABS tablet Take 250 mg by mouth daily      Calcium Carbonate-Vit D-Min 1023-8782 MG-UNIT CHEW Take 600 mg by mouth daily       gabapentin (NEURONTIN) 600 MG tablet Take 1 tablet by mouth 4 times daily as needed (neuropathy) for up to 30 days. 120 tablet 5     No current facility-administered medications for this visit. Review of Systems     Review of Systems   Constitutional: Negative for appetite change, chills, fatigue, fever and unexpected weight change. HENT: Positive for trouble swallowing. Negative for congestion, ear discharge, ear pain, facial swelling, hearing loss, nosebleeds, postnasal drip, sinus pressure, sneezing, sore throat, tinnitus and voice change. Eyes: Negative for itching. Respiratory: Negative for apnea, cough and shortness of breath. Gastrointestinal:        -reflux   Endocrine: Negative for cold intolerance and heat intolerance. Musculoskeletal: Negative for myalgias and neck pain. Skin: Negative for rash. Allergic/Immunologic: Negative for environmental allergies. Neurological: Negative for dizziness and headaches. Psychiatric/Behavioral: Negative for confusion, decreased concentration and sleep disturbance.          PhysicalExam     Vitals:    07/09/20 0943   BP: 137/88   Site: Right Upper Arm   Position: Sitting   Cuff Size: Medium Adult   Pulse: 75   Temp: 97.2 °F (36.2 °C)   TempSrc: Oral   Weight: 233 lb (105.7 kg)   Height: 5' 10\" (1.778 m)       Physical Exam  Constitutional:       General: She is not in acute distress. Appearance: She is well-developed. HENT:      Head: Normocephalic and atraumatic. Right Ear: Tympanic membrane, ear canal and external ear normal. No drainage. No middle ear effusion. Tympanic membrane is not bulging. Tympanic membrane has normal mobility. Left Ear: Tympanic membrane, ear canal and external ear normal. No drainage. No middle ear effusion. Tympanic membrane is not bulging. Tympanic membrane has normal mobility. Ears:      Henriquez exam findings: does not lateralize. Right Rinne: AC > BC. Left Rinne: AC > BC. Nose: No septal deviation, mucosal edema or rhinorrhea. Mouth/Throat:      Lips: Pink. Mouth: Mucous membranes are moist.      Tongue: No lesions. Palate: No mass. Pharynx: Uvula midline. Tonsils: No tonsillar exudate. 2+ on the right. Comments: Inferior right tonsil regrowth    Mirror exam was performed. The nasopharynx, larynx,or hypopharynx were examined. Vocal fold motion was examined. Pertinent findings include: normal vocal cord mobility    Eyes:      Pupils: Pupils are equal, round, and reactive to light. Neck:      Musculoskeletal: Full passive range of motion without pain. Thyroid: No thyroid mass or thyromegaly. Trachea: Trachea and phonation normal.   Cardiovascular:      Pulses: Normal pulses. Pulmonary:      Effort: Pulmonary effort is normal. No accessory muscle usage or respiratory distress. Breath sounds: No stridor. Lymphadenopathy:      Head:      Right side of head: No submental or submandibular adenopathy. Left side of head: No submental or submandibular adenopathy. Cervical: No cervical adenopathy.       Right cervical: No superficial, deep or posterior cervical

## 2020-07-10 ENCOUNTER — HOSPITAL ENCOUNTER (OUTPATIENT)
Dept: GENERAL RADIOLOGY | Age: 65
Discharge: HOME | End: 2020-07-10

## 2020-07-10 PROCEDURE — 74220 X-RAY XM ESOPHAGUS 1CNTRST: CPT

## 2020-07-13 RX ORDER — OMEPRAZOLE 40 MG/1
40 CAPSULE, DELAYED RELEASE ORAL
Qty: 90 CAPSULE | Refills: 2 | Status: SHIPPED | OUTPATIENT
Start: 2020-07-13 | End: 2020-07-28

## 2020-07-13 NOTE — PROGRESS NOTES
Spoke with Danielito La Villa regarding results of recent esophagram.  There is evidence of effacement but not compression by the left thyroid lobe. She does have evidence of reflux on examination. Discussed with Danielito La Villa options of trial of reflux management versus left thyroid lobectomy. She wishes to try a trial treatment of reflux to manage symptoms. Recommend omeprazole 40 mg once daily 30 minutes before breakfast for 4 to 6 weeks. She will follow-up in 4 to 6 weeks for reevaluation.

## 2020-07-21 ENCOUNTER — TELEPHONE (OUTPATIENT)
Dept: ENT CLINIC | Age: 65
End: 2020-07-21

## 2020-07-21 NOTE — TELEPHONE ENCOUNTER
Patient calls into the office this afternoon to advise Dr. Amalia Valencia that she wants to go ahead and have surgery to remove her thyroid. This writer assisted patient in making appointment for this Thursday 7/23/20 to meet with Dr. Amalia Valencia so that they can discuss surgery.

## 2020-07-23 ENCOUNTER — OFFICE VISIT (OUTPATIENT)
Dept: ENT CLINIC | Age: 65
End: 2020-07-23

## 2020-07-23 VITALS
HEART RATE: 78 BPM | HEIGHT: 70 IN | TEMPERATURE: 97.3 F | SYSTOLIC BLOOD PRESSURE: 123 MMHG | WEIGHT: 225.2 LBS | DIASTOLIC BLOOD PRESSURE: 68 MMHG | BODY MASS INDEX: 32.24 KG/M2

## 2020-07-23 PROCEDURE — 99214 OFFICE O/P EST MOD 30 MIN: CPT | Performed by: OTOLARYNGOLOGY

## 2020-07-23 ASSESSMENT — ENCOUNTER SYMPTOMS
SINUS PRESSURE: 0
SHORTNESS OF BREATH: 0
SORE THROAT: 0
FACIAL SWELLING: 0
VOICE CHANGE: 0
APNEA: 0
COUGH: 0
TROUBLE SWALLOWING: 1
EYE ITCHING: 0

## 2020-07-23 NOTE — PROGRESS NOTES
[address], [address], [address] [place] [address]  P: [phone]       Patient     Lamine Neff  1955    ChiefComplaint     Chief Complaint   Patient presents with    Other     Patient is here to discuss surgery with Dr. Gabriel Carrera, has some questions rearding medications and when she should stop them before surgery. Would like to ask about closures after surgery       History of Present Illness     Miladis Peralta 70-year-old female presenting today with her  for further surgical discussion of possible left thyroid lobectomy. Since she was last seen by me she underwent esophagram which demonstrated effacement but no compression of esophagus by left thyroid lobe. Findings also noted mild reflux but no physical obstruction. She has been on omeprazole daily for 10 days. She states she is considering surgery secondary to her endocrinologist counseling her that he feels this will continue to enlarge and surgery safer at a younger age.     Past Medical History     Past Medical History:   Diagnosis Date    Allergic rhinitis     Arthritis     Dizziness     Hip pain     Hypertension     IBS (irritable bowel syndrome)     Neuropathy     Pulmonary embolism (HCC)     Recurrent low back pain     Thyroid disease     enlarged    Tinnitus        Past Surgical History     Past Surgical History:   Procedure Laterality Date    COLONOSCOPY  10/26/2016    Polyps    FOOT SURGERY Left     TONSILLECTOMY         Family History     Family History   Problem Relation Age of Onset    Cancer Father     Heart Disease Sister     Other Sister         Pulmonary Embolism       Social History     Social History     Tobacco Use    Smoking status: Never Smoker    Smokeless tobacco: Never Used   Substance Use Topics    Alcohol use: No    Drug use: No        Allergies     Allergies   Allergen Reactions    Penicillins Hives       Medications     Current Outpatient Medications   Medication Sig Dispense Refill    omeprazole (PRILOSEC) 40 MG delayed release capsule Take 1 capsule by mouth every morning (before breakfast) 90 capsule 2    aspirin 81 MG tablet Take 81 mg by mouth daily      melatonin 3 MG TABS tablet Take 10 mg by mouth daily Take one half tablet nightly       magnesium (MAGNESIUM-OXIDE) 250 MG TABS tablet Take 250 mg by mouth daily      Calcium Carbonate-Vit D-Min 3032-6610 MG-UNIT CHEW Take 600 mg by mouth daily       gabapentin (NEURONTIN) 600 MG tablet Take 1 tablet by mouth 4 times daily as needed (neuropathy) for up to 30 days. 120 tablet 5     No current facility-administered medications for this visit. Review of Systems     Review of Systems   Constitutional: Negative for appetite change, chills, fatigue, fever and unexpected weight change. HENT: Positive for trouble swallowing. Negative for congestion, ear discharge, ear pain, facial swelling, hearing loss, nosebleeds, postnasal drip, sinus pressure, sneezing, sore throat, tinnitus and voice change. Eyes: Negative for itching. Respiratory: Negative for apnea, cough and shortness of breath. Gastrointestinal:        Negative for dysphasia   Endocrine: Negative for cold intolerance and heat intolerance. Musculoskeletal: Negative for myalgias and neck pain. Skin: Negative for rash. Allergic/Immunologic: Negative for environmental allergies. Neurological: Negative for dizziness and headaches. Psychiatric/Behavioral: Negative for confusion, decreased concentration and sleep disturbance. PhysicalExam     Vitals:    07/23/20 0940   BP: 123/68   Site: Right Upper Arm   Position: Sitting   Cuff Size: Large Adult   Pulse: 78   Temp: 97.3 °F (36.3 °C)   TempSrc: Infrared   Weight: 225 lb 3.2 oz (102.2 kg)   Height: 5' 10\" (1.778 m)       Physical Exam  Constitutional:       General: She is not in acute distress. Appearance: She is well-developed. HENT:      Head: Normocephalic and atraumatic. Right Ear: Tympanic membrane, ear canal and external ear normal. No drainage. No middle ear effusion. Tympanic membrane is not bulging. Tympanic membrane has normal mobility. Left Ear: Tympanic membrane, ear canal and external ear normal. No drainage. No middle ear effusion. Tympanic membrane is not bulging. Tympanic membrane has normal mobility. Ears:      Henriquez exam findings: does not lateralize. Right Rinne: AC > BC. Left Rinne: AC > BC. Nose: Septal deviation present. No mucosal edema or rhinorrhea. Mouth/Throat:      Lips: Pink. Mouth: Mucous membranes are moist.      Tongue: No lesions. Palate: No mass. Pharynx: Uvula midline. Tonsils: No tonsillar exudate. 2+ on the right. 2+ on the left. Comments: Inferior right tonsil regrowth    Mirror exam was performed. The nasopharynx, larynx,or hypopharynx were examined. Vocal fold motion was examined. Pertinent findings include: normal vocal cord mobility  Eyes:      Pupils: Pupils are equal, round, and reactive to light. Neck:      Musculoskeletal: Full passive range of motion without pain. Thyroid: No thyroid mass or thyromegaly. Trachea: Trachea and phonation normal.   Cardiovascular:      Pulses: Normal pulses. Pulmonary:      Effort: Pulmonary effort is normal. No accessory muscle usage or respiratory distress. Breath sounds: No stridor. Lymphadenopathy:      Head:      Right side of head: No submental or submandibular adenopathy. Left side of head: No submental or submandibular adenopathy. Cervical: No cervical adenopathy. Right cervical: No superficial, deep or posterior cervical adenopathy. Left cervical: No superficial, deep or posterior cervical adenopathy. Skin:     General: Skin is warm and dry. Neurological:      Mental Status: She is alert and oriented to person, place, and time. Cranial Nerves: No cranial nerve deficit.       Coordination: Coordination normal.      Gait: Gait normal.   Psychiatric:         Thought Content: Thought content normal.           Assessment and Plan     1. Thyroid goiter    2. Thyroid nodule    3. Gastroesophageal reflux disease, esophagitis presence not specified    4. Pharyngoesophageal dysphagia      Extensive discussion was had with patient and her  regarding previous ultrasounds, relative stability of thyroid gland and nodule as well as recent esophagram findings. Informed her that although the left thyroid lobe effaces it does not compress the esophagus and overall swallow study was normal.  She is interested in proceeding with left thyroid lobectomy secondary to concern of continued growth and believe that this is causing her obstruction. Discussed in detail risks of general anesthesia, infection, intraoperative and postoperative bleeding/hematoma, risk of injury to recurrent laryngeal nerve resulting in dysphasia and dysphonia. Postoperative scarring resulting in ongoing globus/dysphagia sensation. Discussed that 30% of people require thyroid supplementation after lobectomy. She stated understanding and all questions were answered. She and her  wish to proceed with left thyroid lobectomy at this time. The patient was counseled at length about the risks of nico Covid-19 during their perioperative period and any recovery window from their procedure. The patient was made aware that nico Covid-19  may worsen their prognosis for recovering from their procedure  and lend to a higher morbidity and/or mortality risk. All material risks, benefits, and reasonable alternatives including postponing the procedure were discussed. The patient does wish to proceed with the procedure at this time. Procedure will be scheduled at her convenience and she will follow-up 1 week after procedure removal        Kane Vasquez DO  7/23/20      Portions of this note were dictated using Dragon.  There may be linguistic errors secondary to the use of this program.

## 2020-07-28 ENCOUNTER — OFFICE VISIT (OUTPATIENT)
Dept: PRIMARY CARE CLINIC | Age: 65
End: 2020-07-28

## 2020-07-28 ENCOUNTER — OFFICE VISIT (OUTPATIENT)
Dept: FAMILY MEDICINE CLINIC | Age: 65
End: 2020-07-28

## 2020-07-28 VITALS
TEMPERATURE: 97.8 F | WEIGHT: 222.4 LBS | OXYGEN SATURATION: 98 % | RESPIRATION RATE: 16 BRPM | HEIGHT: 70 IN | DIASTOLIC BLOOD PRESSURE: 82 MMHG | HEART RATE: 87 BPM | SYSTOLIC BLOOD PRESSURE: 132 MMHG | BODY MASS INDEX: 31.84 KG/M2

## 2020-07-28 DIAGNOSIS — Z01.818 PRE-OP EXAMINATION: ICD-10-CM

## 2020-07-28 LAB
ANION GAP SERPL CALCULATED.3IONS-SCNC: 18 MMOL/L (ref 3–16)
BASOPHILS ABSOLUTE: 0 K/UL (ref 0–0.2)
BASOPHILS RELATIVE PERCENT: 0.7 %
BUN BLDV-MCNC: 12 MG/DL (ref 7–20)
CALCIUM SERPL-MCNC: 10 MG/DL (ref 8.3–10.6)
CHLORIDE BLD-SCNC: 106 MMOL/L (ref 99–110)
CO2: 22 MMOL/L (ref 21–32)
CREAT SERPL-MCNC: 0.6 MG/DL (ref 0.6–1.2)
EOSINOPHILS ABSOLUTE: 0.1 K/UL (ref 0–0.6)
EOSINOPHILS RELATIVE PERCENT: 1.4 %
GFR AFRICAN AMERICAN: >60
GFR NON-AFRICAN AMERICAN: >60
GLUCOSE BLD-MCNC: 95 MG/DL (ref 70–99)
HCT VFR BLD CALC: 42.5 % (ref 36–48)
HEMOGLOBIN: 14.2 G/DL (ref 12–16)
LYMPHOCYTES ABSOLUTE: 1.8 K/UL (ref 1–5.1)
LYMPHOCYTES RELATIVE PERCENT: 29.6 %
MCH RBC QN AUTO: 30.7 PG (ref 26–34)
MCHC RBC AUTO-ENTMCNC: 33.4 G/DL (ref 31–36)
MCV RBC AUTO: 91.7 FL (ref 80–100)
MONOCYTES ABSOLUTE: 0.3 K/UL (ref 0–1.3)
MONOCYTES RELATIVE PERCENT: 5.6 %
NEUTROPHILS ABSOLUTE: 3.7 K/UL (ref 1.7–7.7)
NEUTROPHILS RELATIVE PERCENT: 62.7 %
PDW BLD-RTO: 13.7 % (ref 12.4–15.4)
PLATELET # BLD: 184 K/UL (ref 135–450)
PMV BLD AUTO: 9.2 FL (ref 5–10.5)
POTASSIUM SERPL-SCNC: 4.8 MMOL/L (ref 3.5–5.1)
RBC # BLD: 4.63 M/UL (ref 4–5.2)
SODIUM BLD-SCNC: 146 MMOL/L (ref 136–145)
WBC # BLD: 6 K/UL (ref 4–11)

## 2020-07-28 PROCEDURE — 93000 ELECTROCARDIOGRAM COMPLETE: CPT | Performed by: PHYSICIAN ASSISTANT

## 2020-07-28 PROCEDURE — 99211 OFF/OP EST MAY X REQ PHY/QHP: CPT | Performed by: NURSE PRACTITIONER

## 2020-07-28 PROCEDURE — 99214 OFFICE O/P EST MOD 30 MIN: CPT | Performed by: PHYSICIAN ASSISTANT

## 2020-07-28 SDOH — ECONOMIC STABILITY: INCOME INSECURITY: HOW HARD IS IT FOR YOU TO PAY FOR THE VERY BASICS LIKE FOOD, HOUSING, MEDICAL CARE, AND HEATING?: NOT VERY HARD

## 2020-07-28 SDOH — ECONOMIC STABILITY: TRANSPORTATION INSECURITY
IN THE PAST 12 MONTHS, HAS LACK OF TRANSPORTATION KEPT YOU FROM MEETINGS, WORK, OR FROM GETTING THINGS NEEDED FOR DAILY LIVING?: NO

## 2020-07-28 SDOH — ECONOMIC STABILITY: FOOD INSECURITY: WITHIN THE PAST 12 MONTHS, YOU WORRIED THAT YOUR FOOD WOULD RUN OUT BEFORE YOU GOT MONEY TO BUY MORE.: NEVER TRUE

## 2020-07-28 SDOH — ECONOMIC STABILITY: TRANSPORTATION INSECURITY
IN THE PAST 12 MONTHS, HAS THE LACK OF TRANSPORTATION KEPT YOU FROM MEDICAL APPOINTMENTS OR FROM GETTING MEDICATIONS?: NO

## 2020-07-28 SDOH — ECONOMIC STABILITY: FOOD INSECURITY: WITHIN THE PAST 12 MONTHS, THE FOOD YOU BOUGHT JUST DIDN'T LAST AND YOU DIDN'T HAVE MONEY TO GET MORE.: NEVER TRUE

## 2020-07-28 ASSESSMENT — PATIENT HEALTH QUESTIONNAIRE - PHQ9
SUM OF ALL RESPONSES TO PHQ QUESTIONS 1-9: 1
2. FEELING DOWN, DEPRESSED OR HOPELESS: 1
SUM OF ALL RESPONSES TO PHQ9 QUESTIONS 1 & 2: 1
1. LITTLE INTEREST OR PLEASURE IN DOING THINGS: 0
SUM OF ALL RESPONSES TO PHQ QUESTIONS 1-9: 1

## 2020-07-28 NOTE — PROGRESS NOTES
Preoperative Consultation      Lisa Valenzuela  YOB: 1955    Date of Service:  7/28/2020    Vitals:    07/28/20 1042   Resp: 16   Temp: 97.8 °F (36.6 °C)   TempSrc: Oral   Weight: 222 lb 6.4 oz (100.9 kg)   Height: 5' 10\" (1.778 m)      Wt Readings from Last 2 Encounters:   07/28/20 222 lb 6.4 oz (100.9 kg)   07/23/20 225 lb 3.2 oz (102.2 kg)     BP Readings from Last 3 Encounters:   07/23/20 123/68   07/09/20 137/88   11/21/19 (!) 153/84        Chief Complaint   Patient presents with    Pre-op Exam     thyroidectomy 8/3/2020 Joseline Burden     Allergies   Allergen Reactions    Penicillins Hives     Outpatient Medications Marked as Taking for the 7/28/20 encounter (Office Visit) with RADHA Schaeffer   Medication Sig Dispense Refill    gabapentin (NEURONTIN) 600 MG tablet Take 1 tablet by mouth 4 times daily as needed (neuropathy) for up to 30 days. 120 tablet 5    aspirin 81 MG tablet Take 81 mg by mouth daily      melatonin 3 MG TABS tablet Take 10 mg by mouth daily Take one half tablet nightly       magnesium (MAGNESIUM-OXIDE) 250 MG TABS tablet Take 250 mg by mouth daily      Calcium Carbonate-Vit D-Min 7536-2399 MG-UNIT CHEW Take 600 mg by mouth daily          This patient presents to the office today for a preoperative consultation at the request of surgeon, Dr. Warden Khan, who plans on performing Thyroidectomy on August 3 at [address] [place] [address].  The current problem began 4 years ago, and symptoms have been unchanged with time. Conservative therapy: N/A. Thyroid biopsy revealed benign nodule. Pt notes some dysphagia that may be related. Planned anesthesia: General   Known anesthesia problems: None   Bleeding risk: No recent or remote history of abnormal bleeding  Personal or FH of DVT/PE: Yes - Factor v clotting disorder. Hx of PE/DVT. Not currently on coumadin. Currently taking baby aspirin.    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, lower leg    Generalized osteoarthritis       Past Medical History:   Diagnosis Date    Allergic rhinitis     Arthritis     Dizziness     Hip pain     Hypertension     IBS (irritable bowel syndrome)     Neuropathy     Pulmonary embolism (HCC)     Recurrent low back pain     Thyroid disease     enlarged    Tinnitus      Past Surgical History:   Procedure Laterality Date    COLONOSCOPY  10/26/2016    Polyps    FOOT SURGERY Left     TONSILLECTOMY       Family History   Problem Relation Age of Onset    Cancer Father     Heart Disease Sister     Other Sister         Pulmonary Embolism     Social History     Socioeconomic History    Marital status:      Spouse name: Not on file    Number of children: 2    Years of education: Not on file    Highest education level: Not on file   Occupational History    Not on file   Social Needs    Financial resource strain: Not very hard    Food insecurity     Worry: Never true     Inability: Never true    Transportation needs     Medical: No     Non-medical: No   Tobacco Use    Smoking status: Never Smoker    Smokeless tobacco: Never Used   Substance and Sexual Activity    Alcohol use: No    Drug use: No    Sexual activity: Yes     Partners: Male     Comment: 2 cups /day   Lifestyle    Physical activity     Days per week: Not on file     Minutes per session: Not on file    Stress: Not on file   Relationships    Social connections     Talks on phone: Not on file     Gets together: Not on file     Attends Mandaen service: Not on file Active member of club or organization: Not on file     Attends meetings of clubs or organizations: Not on file     Relationship status: Not on file    Intimate partner violence     Fear of current or ex partner: Not on file     Emotionally abused: Not on file     Physically abused: Not on file     Forced sexual activity: Not on file   Other Topics Concern    Not on file   Social History Narrative    Not on file       Review of Systems  A comprehensive review of systems was negative except for what was noted in the HPI. Physical Exam   Constitutional: She is oriented to person, place, and time. She appears well-developed and well-nourished. No distress. HENT:   Head: Normocephalic and atraumatic. Eyes: Conjunctivae and EOM are normal. Pupils are equal, round, and reactive to light. Neck: Goiter, palpable nodule. Trachea normal and normal range of motion. Neck supple. Cardiovascular: Normal rate, regular rhythm, normal heart sounds and intact distal pulses. Exam reveals no gallop and no friction rub. No murmur heard. Pulmonary/Chest: Effort normal and breath sounds normal. No respiratory distress. She has no wheezes. She has no rales. Musculoskeletal: She exhibits no edema and no tenderness. Neurological: She is alert and oriented to person, place, and time. She has normal strength. No cranial nerve deficit or sensory deficit. Coordination and gait normal using cane to ambulate. Skin: Skin is warm and dry. No rash noted. No erythema. Psychiatric: She has a normal mood and affect. Her behavior is normal.     EKG Interpretation:  normal EKG, normal sinus rhythm, unchanged from previous tracings.     Lab Review   Lab Results   Component Value Date     07/28/2020    K 4.8 07/28/2020     07/28/2020    CO2 22 07/28/2020    BUN 12 07/28/2020    CREATININE 0.6 07/28/2020    GLUCOSE 95 07/28/2020    CALCIUM 10.0 07/28/2020     Lab Results   Component Value Date    WBC 6.0 07/28/2020 HGB 14.2 07/28/2020    HCT 42.5 07/28/2020    MCV 91.7 07/28/2020     07/28/2020           Assessment:       59 y.o. patient with planned surgery as above. Known risk factors for perioperative complications: Clotting disorder- Factor V Clotting Disorder, Hypertension (well controlled)  Current medications which may produce withdrawal symptoms if withheld perioperatively: none      Plan:     1. Preoperative workup as follows: ECG, hemoglobin, hematocrit, electrolytes, creatinine  2. Change in medication regimen before surgery: Discontinue ASA 5 days before surgery  3. Prophylaxis for cardiac events with perioperative beta-blockers: Not indicated  ACC/AHA indications for pre-operative beta-blocker use:    · Vascular surgery with history of postitive stress test  · Intermediate or high risk surgery with history of CAD   · Intermediate or high risk surgery with multiple clinical predictors of CAD- 2 of the following: history of compensated or prior heart failure, history of cerebrovascular disease, DM, or renal insufficiency    Routine administration of higher-dose, long-acting metoprolol in beta-blocker-naïve patients on the day of surgery, and in the absence of dose titration is associated with an overall increase in mortality. Beta-blockers should be started days to weeks prior to surgery and titrated to pulse < 70.  4. Deep vein thrombosis prophylaxis: regimen to be chosen by surgical team. Pt should be able to ambulate after surgery due to nature of surgery. Restart aspirin as soon as possible following surgery. 5. No contraindications to planned surgery.

## 2020-07-29 ENCOUNTER — ANESTHESIA EVENT (OUTPATIENT)
Dept: OPERATING ROOM | Age: 65
End: 2020-07-29

## 2020-07-29 LAB
SARS-COV-2: NOT DETECTED
SOURCE: NORMAL

## 2020-07-29 NOTE — PROGRESS NOTES
Obstructive Sleep Apnea (DAVI) Screening     Patient:  Vesna Cha    YOB: 1955      Medical Record #:  [id number]                     Date:  7/29/2020     1. Are you a loud and/or regular snorer? []  Yes       [x] No    2. Have you been observed to gasp or stop breathing during sleep? []  Yes       [x] No    3. Do you feel tired or groggy upon awakening or do you awaken with a headache?           []  Yes       [x] No    4. Are you often tired or fatigued during the wake time hours? [x]  Yes       [] No    5. Do you fall asleep sitting, reading, watching TV or driving? []  Yes       [x] No    6. Do you often have problems with memory or concentration? []  Yes       [x] No    If patient's DAVI score if greater than or equal to 3, they are considered high risk for DAVI. An anesthesia provider will evaluate the patient and develop a plan of care the day of surgery. Note:  If the patient's BMI is more than 35 kg m¯² , has neck circumference > 40 cm, and/or high blood pressure the risk is greater (© American [employer]nea Association, 2006).

## 2020-07-29 NOTE — PROGRESS NOTES
Preoperative Screening for Elective Surgery/Invasive Procedures While COVID-19 present in the community     Have you tested positive or have been told to self-isolate for COVID-19 like symptoms within the past 28 days? no   Do you currently have any of the following symptoms? no  o Fever >100.0 F or 99.9 F in immunocompromised patients? no  o New onset cough, shortness of breath or difficulty breathing? no  o New onset sore throat, myalgia (muscle aches and pains), headache, loss of taste/smell or diarrhea? no   Have you had a potential exposure to COVID-19 within the past 14 days by:  o Close contact with a confirmed case? no  o Close contact with a healthcare worker,  or essential infrastructure worker (grocery store, restaurant, gas station, public utilities or transportation)? no  o Do you reside in a congregate setting such as; skilled nursing facility, adult home, correctional facility, homeless shelter or  other institutional setting? no  o Have you had recent travel to a known COVID-19 hotspot? no    Indicate if the patient has a positive screen by answering yes to one or more of the above questions. Patients who test positive or screen positive prior to surgery or on the day of surgery should be evaluated in conjunction with the surgeon/proceduralist/anesthesiologist to determine the urgency of the procedure.

## 2020-07-31 ENCOUNTER — PREP FOR PROCEDURE (OUTPATIENT)
Dept: ENT CLINIC | Age: 65
End: 2020-07-31

## 2020-07-31 ENCOUNTER — TELEPHONE (OUTPATIENT)
Dept: ADMINISTRATIVE | Age: 65
End: 2020-07-31

## 2020-07-31 RX ORDER — SODIUM CHLORIDE 0.9 % (FLUSH) 0.9 %
10 SYRINGE (ML) INJECTION PRN
Status: CANCELLED | OUTPATIENT
Start: 2020-07-31

## 2020-07-31 RX ORDER — SODIUM CHLORIDE 0.9 % (FLUSH) 0.9 %
10 SYRINGE (ML) INJECTION EVERY 12 HOURS SCHEDULED
Status: CANCELLED | OUTPATIENT
Start: 2020-07-31

## 2020-08-03 ENCOUNTER — ANESTHESIA (OUTPATIENT)
Dept: OPERATING ROOM | Age: 65
End: 2020-08-03

## 2020-08-03 ENCOUNTER — HOSPITAL ENCOUNTER (OUTPATIENT)
Age: 65
Setting detail: OUTPATIENT SURGERY
Discharge: HOME | End: 2020-08-03
Attending: OTOLARYNGOLOGY | Admitting: OTOLARYNGOLOGY

## 2020-08-03 VITALS
RESPIRATION RATE: 1 BRPM | OXYGEN SATURATION: 97 % | DIASTOLIC BLOOD PRESSURE: 58 MMHG | SYSTOLIC BLOOD PRESSURE: 97 MMHG

## 2020-08-03 VITALS
HEIGHT: 70 IN | SYSTOLIC BLOOD PRESSURE: 139 MMHG | BODY MASS INDEX: 33.79 KG/M2 | RESPIRATION RATE: 13 BRPM | HEART RATE: 86 BPM | WEIGHT: 236 LBS | DIASTOLIC BLOOD PRESSURE: 64 MMHG | TEMPERATURE: 98.1 F | OXYGEN SATURATION: 93 %

## 2020-08-03 PROBLEM — E04.9 THYROID GOITER: Status: ACTIVE | Noted: 2020-08-03

## 2020-08-03 PROCEDURE — 7100000011 HC PHASE II RECOVERY - ADDTL 15 MIN: Performed by: OTOLARYNGOLOGY

## 2020-08-03 PROCEDURE — 7100000000 HC PACU RECOVERY - FIRST 15 MIN: Performed by: OTOLARYNGOLOGY

## 2020-08-03 PROCEDURE — 2500000003 HC RX 250 WO HCPCS: Performed by: OTOLARYNGOLOGY

## 2020-08-03 PROCEDURE — 6360000002 HC RX W HCPCS: Performed by: NURSE ANESTHETIST, CERTIFIED REGISTERED

## 2020-08-03 PROCEDURE — 88307 TISSUE EXAM BY PATHOLOGIST: CPT

## 2020-08-03 PROCEDURE — 6360000002 HC RX W HCPCS: Performed by: ANESTHESIOLOGY

## 2020-08-03 PROCEDURE — 7100000010 HC PHASE II RECOVERY - FIRST 15 MIN: Performed by: OTOLARYNGOLOGY

## 2020-08-03 PROCEDURE — 2500000003 HC RX 250 WO HCPCS: Performed by: ANESTHESIOLOGY

## 2020-08-03 PROCEDURE — 60220 PARTIAL REMOVAL OF THYROID: CPT | Performed by: OTOLARYNGOLOGY

## 2020-08-03 PROCEDURE — 2720000010 HC SURG SUPPLY STERILE: Performed by: OTOLARYNGOLOGY

## 2020-08-03 PROCEDURE — 3600000014 HC SURGERY LEVEL 4 ADDTL 15MIN: Performed by: OTOLARYNGOLOGY

## 2020-08-03 PROCEDURE — 2709999900 HC NON-CHARGEABLE SUPPLY: Performed by: OTOLARYNGOLOGY

## 2020-08-03 PROCEDURE — 3700000000 HC ANESTHESIA ATTENDED CARE: Performed by: OTOLARYNGOLOGY

## 2020-08-03 PROCEDURE — 2500000003 HC RX 250 WO HCPCS: Performed by: NURSE ANESTHETIST, CERTIFIED REGISTERED

## 2020-08-03 PROCEDURE — 2580000003 HC RX 258: Performed by: OTOLARYNGOLOGY

## 2020-08-03 PROCEDURE — 7100000001 HC PACU RECOVERY - ADDTL 15 MIN: Performed by: OTOLARYNGOLOGY

## 2020-08-03 PROCEDURE — 2580000003 HC RX 258: Performed by: ANESTHESIOLOGY

## 2020-08-03 PROCEDURE — 3600000004 HC SURGERY LEVEL 4 BASE: Performed by: OTOLARYNGOLOGY

## 2020-08-03 PROCEDURE — 3700000001 HC ADD 15 MINUTES (ANESTHESIA): Performed by: OTOLARYNGOLOGY

## 2020-08-03 RX ORDER — MORPHINE SULFATE 2 MG/ML
1 INJECTION, SOLUTION INTRAMUSCULAR; INTRAVENOUS EVERY 5 MIN PRN
Status: DISCONTINUED | OUTPATIENT
Start: 2020-08-03 | End: 2020-08-03 | Stop reason: HOSPADM

## 2020-08-03 RX ORDER — LIDOCAINE HYDROCHLORIDE AND EPINEPHRINE 10; 10 MG/ML; UG/ML
INJECTION, SOLUTION INFILTRATION; PERINEURAL PRN
Status: DISCONTINUED | OUTPATIENT
Start: 2020-08-03 | End: 2020-08-03 | Stop reason: ALTCHOICE

## 2020-08-03 RX ORDER — MAGNESIUM HYDROXIDE 1200 MG/15ML
LIQUID ORAL CONTINUOUS PRN
Status: COMPLETED | OUTPATIENT
Start: 2020-08-03 | End: 2020-08-03

## 2020-08-03 RX ORDER — ONDANSETRON 2 MG/ML
INJECTION INTRAMUSCULAR; INTRAVENOUS PRN
Status: DISCONTINUED | OUTPATIENT
Start: 2020-08-03 | End: 2020-08-03 | Stop reason: SDUPTHER

## 2020-08-03 RX ORDER — DEXAMETHASONE SODIUM PHOSPHATE 4 MG/ML
INJECTION, SOLUTION INTRA-ARTICULAR; INTRALESIONAL; INTRAMUSCULAR; INTRAVENOUS; SOFT TISSUE PRN
Status: DISCONTINUED | OUTPATIENT
Start: 2020-08-03 | End: 2020-08-03 | Stop reason: SDUPTHER

## 2020-08-03 RX ORDER — LABETALOL HYDROCHLORIDE 5 MG/ML
5 INJECTION, SOLUTION INTRAVENOUS EVERY 10 MIN PRN
Status: DISCONTINUED | OUTPATIENT
Start: 2020-08-03 | End: 2020-08-03 | Stop reason: HOSPADM

## 2020-08-03 RX ORDER — LIDOCAINE HYDROCHLORIDE 20 MG/ML
INJECTION, SOLUTION INFILTRATION; PERINEURAL PRN
Status: DISCONTINUED | OUTPATIENT
Start: 2020-08-03 | End: 2020-08-03 | Stop reason: SDUPTHER

## 2020-08-03 RX ORDER — SODIUM CHLORIDE 0.9 % (FLUSH) 0.9 %
10 SYRINGE (ML) INJECTION PRN
Status: DISCONTINUED | OUTPATIENT
Start: 2020-08-03 | End: 2020-08-03 | Stop reason: HOSPADM

## 2020-08-03 RX ORDER — SODIUM CHLORIDE, SODIUM LACTATE, POTASSIUM CHLORIDE, CALCIUM CHLORIDE 600; 310; 30; 20 MG/100ML; MG/100ML; MG/100ML; MG/100ML
INJECTION, SOLUTION INTRAVENOUS CONTINUOUS
Status: DISCONTINUED | OUTPATIENT
Start: 2020-08-03 | End: 2020-08-03 | Stop reason: HOSPADM

## 2020-08-03 RX ORDER — FENTANYL CITRATE 50 UG/ML
INJECTION, SOLUTION INTRAMUSCULAR; INTRAVENOUS PRN
Status: DISCONTINUED | OUTPATIENT
Start: 2020-08-03 | End: 2020-08-03 | Stop reason: SDUPTHER

## 2020-08-03 RX ORDER — SODIUM CHLORIDE 0.9 % (FLUSH) 0.9 %
10 SYRINGE (ML) INJECTION EVERY 12 HOURS SCHEDULED
Status: DISCONTINUED | OUTPATIENT
Start: 2020-08-03 | End: 2020-08-03 | Stop reason: HOSPADM

## 2020-08-03 RX ORDER — BACITRACIN ZINC AND POLYMYXIN B SULFATE 500; 10000 [USP'U]/G; [USP'U]/G
OINTMENT TOPICAL PRN
Status: DISCONTINUED | OUTPATIENT
Start: 2020-08-03 | End: 2020-08-03 | Stop reason: ALTCHOICE

## 2020-08-03 RX ORDER — OXYCODONE HYDROCHLORIDE AND ACETAMINOPHEN 5; 325 MG/1; MG/1
2 TABLET ORAL PRN
Status: DISCONTINUED | OUTPATIENT
Start: 2020-08-03 | End: 2020-08-03 | Stop reason: HOSPADM

## 2020-08-03 RX ORDER — ONDANSETRON 2 MG/ML
4 INJECTION INTRAMUSCULAR; INTRAVENOUS PRN
Status: DISCONTINUED | OUTPATIENT
Start: 2020-08-03 | End: 2020-08-03 | Stop reason: HOSPADM

## 2020-08-03 RX ORDER — MEPERIDINE HYDROCHLORIDE 50 MG/ML
12.5 INJECTION INTRAMUSCULAR; INTRAVENOUS; SUBCUTANEOUS EVERY 5 MIN PRN
Status: DISCONTINUED | OUTPATIENT
Start: 2020-08-03 | End: 2020-08-03 | Stop reason: HOSPADM

## 2020-08-03 RX ORDER — HYDROCODONE BITARTRATE AND ACETAMINOPHEN 5; 325 MG/1; MG/1
1 TABLET ORAL EVERY 4 HOURS PRN
Qty: 15 TABLET | Refills: 0 | Status: SHIPPED | OUTPATIENT
Start: 2020-08-03 | End: 2020-08-06

## 2020-08-03 RX ORDER — PROPOFOL 10 MG/ML
INJECTION, EMULSION INTRAVENOUS PRN
Status: DISCONTINUED | OUTPATIENT
Start: 2020-08-03 | End: 2020-08-03 | Stop reason: SDUPTHER

## 2020-08-03 RX ORDER — LIDOCAINE HYDROCHLORIDE 10 MG/ML
2 INJECTION, SOLUTION INFILTRATION; PERINEURAL
Status: DISCONTINUED | OUTPATIENT
Start: 2020-08-03 | End: 2020-08-03 | Stop reason: HOSPADM

## 2020-08-03 RX ORDER — MAGNESIUM HYDROXIDE 1200 MG/15ML
LIQUID ORAL PRN
Status: DISCONTINUED | OUTPATIENT
Start: 2020-08-03 | End: 2020-08-03 | Stop reason: ALTCHOICE

## 2020-08-03 RX ORDER — SUCCINYLCHOLINE/SOD CL,ISO/PF 100 MG/5ML
SYRINGE (ML) INTRAVENOUS PRN
Status: DISCONTINUED | OUTPATIENT
Start: 2020-08-03 | End: 2020-08-03 | Stop reason: SDUPTHER

## 2020-08-03 RX ORDER — HYDRALAZINE HYDROCHLORIDE 20 MG/ML
5 INJECTION INTRAMUSCULAR; INTRAVENOUS EVERY 10 MIN PRN
Status: DISCONTINUED | OUTPATIENT
Start: 2020-08-03 | End: 2020-08-03 | Stop reason: HOSPADM

## 2020-08-03 RX ORDER — EPHEDRINE SULFATE 50 MG/ML
INJECTION INTRAVENOUS PRN
Status: DISCONTINUED | OUTPATIENT
Start: 2020-08-03 | End: 2020-08-03 | Stop reason: SDUPTHER

## 2020-08-03 RX ORDER — MORPHINE SULFATE 2 MG/ML
2 INJECTION, SOLUTION INTRAMUSCULAR; INTRAVENOUS EVERY 5 MIN PRN
Status: DISCONTINUED | OUTPATIENT
Start: 2020-08-03 | End: 2020-08-03 | Stop reason: HOSPADM

## 2020-08-03 RX ORDER — DIPHENHYDRAMINE HYDROCHLORIDE 50 MG/ML
12.5 INJECTION INTRAMUSCULAR; INTRAVENOUS
Status: DISCONTINUED | OUTPATIENT
Start: 2020-08-03 | End: 2020-08-03 | Stop reason: HOSPADM

## 2020-08-03 RX ORDER — PHENYLEPHRINE HCL IN 0.9% NACL 1 MG/10 ML
SYRINGE (ML) INTRAVENOUS PRN
Status: DISCONTINUED | OUTPATIENT
Start: 2020-08-03 | End: 2020-08-03 | Stop reason: SDUPTHER

## 2020-08-03 RX ORDER — OXYCODONE HYDROCHLORIDE AND ACETAMINOPHEN 5; 325 MG/1; MG/1
1 TABLET ORAL PRN
Status: DISCONTINUED | OUTPATIENT
Start: 2020-08-03 | End: 2020-08-03 | Stop reason: HOSPADM

## 2020-08-03 RX ORDER — PROMETHAZINE HYDROCHLORIDE 25 MG/ML
6.25 INJECTION, SOLUTION INTRAMUSCULAR; INTRAVENOUS
Status: DISCONTINUED | OUTPATIENT
Start: 2020-08-03 | End: 2020-08-03 | Stop reason: HOSPADM

## 2020-08-03 RX ADMIN — Medication 100 MCG: at 09:34

## 2020-08-03 RX ADMIN — SODIUM CHLORIDE, POTASSIUM CHLORIDE, SODIUM LACTATE AND CALCIUM CHLORIDE: 600; 310; 30; 20 INJECTION, SOLUTION INTRAVENOUS at 08:41

## 2020-08-03 RX ADMIN — PROMETHAZINE HYDROCHLORIDE 6.25 MG: 25 INJECTION INTRAMUSCULAR; INTRAVENOUS at 12:28

## 2020-08-03 RX ADMIN — ONDANSETRON 4 MG: 2 INJECTION INTRAMUSCULAR; INTRAVENOUS at 09:15

## 2020-08-03 RX ADMIN — SODIUM CHLORIDE, POTASSIUM CHLORIDE, SODIUM LACTATE AND CALCIUM CHLORIDE: 600; 310; 30; 20 INJECTION, SOLUTION INTRAVENOUS at 09:15

## 2020-08-03 RX ADMIN — Medication 40 MCG: at 10:20

## 2020-08-03 RX ADMIN — LIDOCAINE HYDROCHLORIDE 100 MG: 20 INJECTION, SOLUTION INFILTRATION; PERINEURAL at 09:20

## 2020-08-03 RX ADMIN — EPHEDRINE SULFATE 10 MG: 50 INJECTION INTRAVENOUS at 09:55

## 2020-08-03 RX ADMIN — FENTANYL CITRATE 50 MCG: 50 INJECTION, SOLUTION INTRAMUSCULAR; INTRAVENOUS at 10:30

## 2020-08-03 RX ADMIN — PROPOFOL 150 MG: 10 INJECTION, EMULSION INTRAVENOUS at 09:20

## 2020-08-03 RX ADMIN — FAMOTIDINE 20 MG: 10 INJECTION, SOLUTION INTRAVENOUS at 08:40

## 2020-08-03 RX ADMIN — HYDROMORPHONE HYDROCHLORIDE 0.5 MG: 1 INJECTION, SOLUTION INTRAMUSCULAR; INTRAVENOUS; SUBCUTANEOUS at 12:28

## 2020-08-03 RX ADMIN — Medication 100 MCG: at 09:36

## 2020-08-03 RX ADMIN — FENTANYL CITRATE 50 MCG: 50 INJECTION, SOLUTION INTRAMUSCULAR; INTRAVENOUS at 09:15

## 2020-08-03 RX ADMIN — ONDANSETRON 4 MG: 2 INJECTION INTRAMUSCULAR; INTRAVENOUS at 11:58

## 2020-08-03 RX ADMIN — FENTANYL CITRATE 150 MCG: 50 INJECTION, SOLUTION INTRAMUSCULAR; INTRAVENOUS at 09:35

## 2020-08-03 RX ADMIN — Medication 900 MG: at 09:15

## 2020-08-03 RX ADMIN — DEXAMETHASONE SODIUM PHOSPHATE 8 MG: 4 INJECTION, SOLUTION INTRAMUSCULAR; INTRAVENOUS at 09:20

## 2020-08-03 RX ADMIN — Medication 100 MCG: at 09:53

## 2020-08-03 RX ADMIN — Medication 100 MG: at 09:20

## 2020-08-03 RX ADMIN — Medication 40 MCG: at 11:16

## 2020-08-03 ASSESSMENT — PULMONARY FUNCTION TESTS
PIF_VALUE: 23
PIF_VALUE: 6
PIF_VALUE: 22
PIF_VALUE: 17
PIF_VALUE: 25
PIF_VALUE: 23
PIF_VALUE: 4
PIF_VALUE: 0
PIF_VALUE: 22
PIF_VALUE: 24
PIF_VALUE: 23
PIF_VALUE: 1
PIF_VALUE: 22
PIF_VALUE: 1
PIF_VALUE: 23
PIF_VALUE: 22
PIF_VALUE: 1
PIF_VALUE: 21
PIF_VALUE: 22
PIF_VALUE: 22
PIF_VALUE: 23
PIF_VALUE: 22
PIF_VALUE: 20
PIF_VALUE: 15
PIF_VALUE: 3
PIF_VALUE: 19
PIF_VALUE: 19
PIF_VALUE: 23
PIF_VALUE: 23
PIF_VALUE: 22
PIF_VALUE: 23
PIF_VALUE: 23
PIF_VALUE: 15
PIF_VALUE: 22
PIF_VALUE: 2
PIF_VALUE: 25
PIF_VALUE: 0
PIF_VALUE: 23
PIF_VALUE: 21
PIF_VALUE: 22
PIF_VALUE: 16
PIF_VALUE: 22
PIF_VALUE: 1
PIF_VALUE: 22
PIF_VALUE: 22
PIF_VALUE: 16
PIF_VALUE: 22
PIF_VALUE: 7
PIF_VALUE: 22
PIF_VALUE: 16
PIF_VALUE: 19
PIF_VALUE: 24
PIF_VALUE: 22
PIF_VALUE: 16
PIF_VALUE: 16
PIF_VALUE: 22
PIF_VALUE: 21
PIF_VALUE: 16
PIF_VALUE: 21
PIF_VALUE: 24
PIF_VALUE: 23
PIF_VALUE: 16
PIF_VALUE: 19
PIF_VALUE: 24
PIF_VALUE: 21
PIF_VALUE: 22
PIF_VALUE: 17
PIF_VALUE: 17
PIF_VALUE: 24
PIF_VALUE: 23
PIF_VALUE: 22
PIF_VALUE: 23
PIF_VALUE: 22
PIF_VALUE: 22
PIF_VALUE: 19
PIF_VALUE: 1
PIF_VALUE: 2
PIF_VALUE: 23
PIF_VALUE: 24
PIF_VALUE: 22
PIF_VALUE: 19
PIF_VALUE: 23
PIF_VALUE: 16
PIF_VALUE: 23
PIF_VALUE: 22
PIF_VALUE: 22
PIF_VALUE: 16
PIF_VALUE: 23
PIF_VALUE: 0
PIF_VALUE: 21
PIF_VALUE: 21
PIF_VALUE: 23
PIF_VALUE: 22
PIF_VALUE: 22
PIF_VALUE: 20
PIF_VALUE: 22
PIF_VALUE: 23
PIF_VALUE: 22
PIF_VALUE: 21
PIF_VALUE: 20
PIF_VALUE: 18
PIF_VALUE: 17
PIF_VALUE: 18
PIF_VALUE: 23
PIF_VALUE: 17
PIF_VALUE: 22
PIF_VALUE: 23
PIF_VALUE: 22
PIF_VALUE: 22
PIF_VALUE: 24
PIF_VALUE: 22
PIF_VALUE: 23
PIF_VALUE: 22
PIF_VALUE: 22
PIF_VALUE: 24
PIF_VALUE: 23
PIF_VALUE: 21
PIF_VALUE: 22
PIF_VALUE: 22
PIF_VALUE: 17
PIF_VALUE: 22
PIF_VALUE: 23
PIF_VALUE: 1
PIF_VALUE: 19
PIF_VALUE: 22
PIF_VALUE: 1
PIF_VALUE: 16
PIF_VALUE: 20
PIF_VALUE: 15
PIF_VALUE: 23
PIF_VALUE: 20
PIF_VALUE: 16
PIF_VALUE: 22
PIF_VALUE: 10
PIF_VALUE: 22
PIF_VALUE: 23
PIF_VALUE: 17
PIF_VALUE: 0
PIF_VALUE: 22
PIF_VALUE: 20
PIF_VALUE: 22
PIF_VALUE: 15
PIF_VALUE: 15
PIF_VALUE: 22

## 2020-08-03 ASSESSMENT — ENCOUNTER SYMPTOMS
COUGH: 0
SORE THROAT: 0
EYE ITCHING: 0
APNEA: 0
FACIAL SWELLING: 0
VOICE CHANGE: 0
SHORTNESS OF BREATH: 0
SINUS PRESSURE: 0
TROUBLE SWALLOWING: 1

## 2020-08-03 ASSESSMENT — PAIN DESCRIPTION - PAIN TYPE: TYPE: OTHER (COMMENT)

## 2020-08-03 ASSESSMENT — PAIN SCALES - GENERAL
PAINLEVEL_OUTOF10: 4
PAINLEVEL_OUTOF10: 7

## 2020-08-03 ASSESSMENT — PAIN - FUNCTIONAL ASSESSMENT
PAIN_FUNCTIONAL_ASSESSMENT: 0-10
PAIN_FUNCTIONAL_ASSESSMENT: 0-10

## 2020-08-03 NOTE — ANESTHESIA POSTPROCEDURE EVALUATION
Department of Anesthesiology  Postprocedure Note    Patient: Jayshree Ponce  MRN: [id number]  YOB: 1955  Date of evaluation: 8/3/2020  Time:  12:23 PM     Procedure Summary     Date:  08/03/20 Room / Location:  [address]    Anesthesia Start:  0915 Anesthesia Stop:  1967    Procedure:  LEFT THYROID LOBECTOMY WITH NERVE MONITORING  CPT CODE - 71395 (Left Neck) Diagnosis:       Thyroid goiter      Thyroid nodule      Pharyngoesophageal dysphagia      (THYROID GOITER WITH COMPRESSIVE SYMPTOMS)    Surgeon:  Aries Colunga DO Responsible Provider:  Mable Godoy MD    Anesthesia Type:  general ASA Status:  3          Anesthesia Type: general    Toro Phase I: Toro Score: 9    Toro Phase II:      Last vitals: Reviewed and per EMR flowsheets.        Anesthesia Post Evaluation    Patient location during evaluation: PACU  Patient participation: complete - patient participated  Level of consciousness: awake and alert  Pain score: 0  Airway patency: patent  Nausea & Vomiting: no nausea and no vomiting  Complications: no  Cardiovascular status: blood pressure returned to baseline  Respiratory status: acceptable  Hydration status: stable

## 2020-08-03 NOTE — H&P
[facility] [address], [address]  [place], [place]  P: [phone]       Patient     Anderson Shultz  1955    ChiefComplaint     No chief complaint on file. History of Present Illness     Vee Melvin 77-year-old female presenting today with her  for further surgical discussion of possible left thyroid lobectomy. Since she was last seen by me she underwent esophagram which demonstrated effacement but no compression of esophagus by left thyroid lobe. Findings also noted mild reflux but no physical obstruction. She has been on omeprazole daily for 10 days. She states she is considering surgery secondary to her endocrinologist counseling her that he feels this will continue to enlarge and surgery safer at a younger age.     Past Medical History     Past Medical History:   Diagnosis Date    Allergic rhinitis     Arthritis     Dizziness     Hip pain     Hx of blood clots 2012    PE    Hypertension     Does not currently take any medications    IBS (irritable bowel syndrome)     Neuropathy     Pulmonary embolism (HCC)     Recurrent low back pain     Thyroid disease     enlarged    Tinnitus        Past Surgical History     Past Surgical History:   Procedure Laterality Date    COLONOSCOPY  10/26/2016    Polyps    FOOT SURGERY Left     TONSILLECTOMY         Family History     Family History   Problem Relation Age of Onset    Cancer Father     Heart Disease Sister     Other Sister         Pulmonary Embolism       Social History     Social History     Tobacco Use    Smoking status: Never Smoker    Smokeless tobacco: Never Used   Substance Use Topics    Alcohol use: No    Drug use: No        Allergies     Allergies   Allergen Reactions    Penicillins Hives       Medications     Current Facility-Administered Medications   Medication Dose Route Frequency Provider Last Rate Last Dose    lactated ringers infusion   Intravenous Continuous Jignesh Vanegas Rebecca Dykes  mL/hr at 08/03/20 0841      sodium chloride flush 0.9 % injection 10 mL  10 mL Intravenous 2 times per day Rohan Forbes MD        sodium chloride flush 0.9 % injection 10 mL  10 mL Intravenous PRN Rohan Forbes MD        lidocaine 1 % injection 2 mL  2 mL Intradermal Once PRN Jose Juan Leiva MD        lactated ringers infusion   Intravenous Continuous Jose Juan Leiva MD        clindamycin (CLEOCIN) 900 mg in dextrose 5% 50 mL IVPB  900 mg Intravenous On Call to [address] [place] [address], DO        sodium chloride flush 0.9 % injection 10 mL  10 mL Intravenous 2 times per day Shaina Love, DO        sodium chloride flush 0.9 % injection 10 mL  10 mL Intravenous PRN Shaina Love DO        HYDROmorphone (DILAUDID) injection 0.25 mg  0.25 mg Intravenous Q5 Min PRN Nohelia De La Rosa MD        HYDROmorphone (DILAUDID) injection 0.5 mg  0.5 mg Intravenous Q5 Min PRN Nohelia De La Rosa MD        morphine (PF) injection 1 mg  1 mg Intravenous Q5 Min PRN Nohelia De La Rosa MD        morphine (PF) injection 2 mg  2 mg Intravenous Q5 Min PRN Nohelia De La Rosa MD        oxyCODONE-acetaminophen (PERCOCET) 5-325 MG per tablet 1 tablet  1 tablet Oral PRN MD Chucky Hahn oxyCODONE-acetaminophen (PERCOCET) 5-325 MG per tablet 2 tablet  2 tablet Oral PRN Nohelia De La Rosa MD        diphenhydrAMINE (BENADRYL) injection 12.5 mg  12.5 mg Intravenous Once PRN Nohelia De La Rosa MD        ondansetron [facility]) injection 4 mg  4 mg Intravenous PRN Nohelia De La Rosa MD        promethazine (PHENERGAN) injection 6.25 mg  6.25 mg Intravenous Q15 Min PRN Nohelia De La Rosa MD        labetalol (NORMODYNE;TRANDATE) injection 5 mg  5 mg Intravenous Q10 Min PRN Nohelia De La Rosa MD        hydrALAZINE (APRESOLINE) injection 5 mg  5 mg Intravenous Q10 Min PRN Nohelia De La Rosa MD        meperidine (DEMEROL) injection 12.5 mg  12.5 mg Intravenous Q5 Min PRN Nohelia De La Rosa MD Review of Systems     Review of Systems   Constitutional: Negative for appetite change, chills, fatigue, fever and unexpected weight change. HENT: Positive for trouble swallowing. Negative for congestion, ear discharge, ear pain, facial swelling, hearing loss, nosebleeds, postnasal drip, sinus pressure, sneezing, sore throat, tinnitus and voice change. Eyes: Negative for itching. Respiratory: Negative for apnea, cough and shortness of breath. Gastrointestinal:        Negative for dysphasia   Endocrine: Negative for cold intolerance and heat intolerance. Musculoskeletal: Negative for myalgias and neck pain. Skin: Negative for rash. Allergic/Immunologic: Negative for environmental allergies. Neurological: Negative for dizziness and headaches. Psychiatric/Behavioral: Negative for confusion, decreased concentration and sleep disturbance. PhysicalExam     Vitals:    07/29/20 1530 08/03/20 0818   BP:  131/82   Pulse:  86   Resp:  12   Temp:  98.2 °F (36.8 °C)   TempSrc:  Temporal   Weight: 225 lb (102.1 kg) 236 lb (107 kg)   Height: 5' 10\" (1.778 m) 5' 10\" (1.778 m)       Physical Exam  Constitutional:       General: She is not in acute distress. Appearance: She is well-developed. HENT:      Head: Normocephalic and atraumatic. Right Ear: Tympanic membrane, ear canal and external ear normal. No drainage. No middle ear effusion. Tympanic membrane is not bulging. Tympanic membrane has normal mobility. Left Ear: Tympanic membrane, ear canal and external ear normal. No drainage. No middle ear effusion. Tympanic membrane is not bulging. Tympanic membrane has normal mobility. Ears:      Henriquez exam findings: does not lateralize. Right Rinne: AC > BC. Left Rinne: AC > BC. Nose: Septal deviation present. No mucosal edema or rhinorrhea. Mouth/Throat:      Lips: Pink. Mouth: Mucous membranes are moist.      Tongue: No lesions. Palate: No mass. secondary to concern of continued growth and believe that this is causing her obstruction. Discussed in detail risks of general anesthesia, infection, intraoperative and postoperative bleeding/hematoma, risk of injury to recurrent laryngeal nerve resulting in dysphasia and dysphonia. Postoperative scarring resulting in ongoing globus/dysphagia sensation. Discussed that 30% of people require thyroid supplementation after lobectomy. She stated understanding and all questions were answered. She and her  wish to proceed with left thyroid lobectomy at this time. The patient was counseled at length about the risks of nico Covid-19 during their perioperative period and any recovery window from their procedure. The patient was made aware that nico Covid-19  may worsen their prognosis for recovering from their procedure  and lend to a higher morbidity and/or mortality risk. All material risks, benefits, and reasonable alternatives including postponing the procedure were discussed. The patient does wish to proceed with the procedure at this time. Procedure will be scheduled at her convenience and she will follow-up 1 week after procedure removal        Marleny Ford DO  7/23/20      Portions of this note were dictated using Dragon.  There may be linguistic errors secondary to the use of this program.

## 2020-08-03 NOTE — BRIEF OP NOTE
Brief Postoperative Note      Patient: Morgan Varela  YOB: 1955  MRN: [id number]    Date of Procedure: 8/3/2020    Pre-Op Diagnosis: THYROID GOITER WITH COMPRESSIVE SYMPTOMS    Post-Op Diagnosis: Same       Procedure(s):  LEFT THYROID LOBECTOMY WITH NERVE MONITORING  CPT CODE - 94938    Surgeon(s):  DO Arsalan Aldridge MD    Assistant:  Surgical Assistant: Yue Way    Anesthesia: General    Estimated Blood Loss (mL): less than 50     Complications: None    Specimens:   ID Type Source Tests Collected by Time Destination   A : LEFT THYROID LOBE Tissue Tissue SURGICAL PATHOLOGY Chastity Graves DO 8/3/2020 1047        Implants:  * No implants in log *      Drains:   NG/OG/NJ/NE Tube Nasogastric Left nostril (Active)       Findings: enlarged left thyroid lobe    Electronically signed by Chastity Gravse DO on 8/3/2020 at 11:41 AM

## 2020-08-03 NOTE — PROGRESS NOTES
Patient was able to ambulate, eat, drink and void prior to discharge. Patient expressed that her pain level was tolerable at 4/10. Patient a/ox4 and steady on feet.

## 2020-08-03 NOTE — PROGRESS NOTES
Report received from OLIVA Torres and Sapna Aranda RN. VSS. Sp02 95% with 2 L NC. Will continue to monitor.

## 2020-08-03 NOTE — ANESTHESIA PRE PROCEDURE
Department of Anesthesiology  Preprocedure Note       Name:  Edward Jones   Age:  59 y.o.  :  1955                                          MRN:  [id number]         Date:  8/3/2020      Surgeon: Lara Milligan):  Mose Denver, DO Alleen Gouge, MD    Procedure: Procedure(s):  LEFT THYROID LOBECTOMY WITH NERVE MONITORING  CPT CODE - 12468    Medications prior to admission:   Prior to Admission medications    Medication Sig Start Date End Date Taking? Authorizing Provider   gabapentin (NEURONTIN) 600 MG tablet Take 1 tablet by mouth 4 times daily as needed (neuropathy) for up to 30 days.  10/11/19 7/28/21 Yes Karen Burkitt, MD   melatonin 3 MG TABS tablet Take 10 mg by mouth daily Take one half tablet nightly    Yes Historical Provider, MD   magnesium (MAGNESIUM-OXIDE) 250 MG TABS tablet Take 250 mg by mouth daily   Yes Historical Provider, MD   Calcium Carbonate-Vit D-Min 6605-0664 MG-UNIT CHEW Take 600 mg by mouth daily  13  Yes Historical Provider, MD   aspirin 81 MG tablet Take 81 mg by mouth daily    Historical Provider, MD       Current medications:    Current Facility-Administered Medications   Medication Dose Route Frequency Provider Last Rate Last Dose    lactated ringers infusion   Intravenous Continuous Claude Montelongo  mL/hr at 20 0841      sodium chloride flush 0.9 % injection 10 mL  10 mL Intravenous 2 times per day Claued Montelongo MD        sodium chloride flush 0.9 % injection 10 mL  10 mL Intravenous PRN Claude Montelongo MD        lidocaine 1 % injection 2 mL  2 mL Intradermal Once PRN Bernardino Pederson MD        lactated ringers infusion   Intravenous Continuous Bernardino Pederson MD        clindamycin (CLEOCIN) 900 mg in dextrose 5% 50 mL IVPB  900 mg Intravenous On Call to [address] [place] [address], DO        sodium chloride flush 0.9 % injection 10 mL  10 mL Intravenous 2 times per day Mose Denver, DO        sodium chloride flush 0.9 % injection 10 mL  10 mL Intravenous PRN Franklin Minor DO           Allergies: Allergies   Allergen Reactions    Penicillins Hives       Problem List:    Patient Active Problem List   Diagnosis Code    IBS (irritable bowel syndrome) K58.9    Recurrent low back pain M54.5    Hip pain M25.559    Pulmonary embolism (HCC) I26.99    Chondrocalcinosis of knee M11.269    Lumbar disc displacement without myelopathy M51.26    Polyneuropathy G62.9    Essential hypertension I10    Cervical disc disorder of mid-cervical region M50.920    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, lower leg M11.869    Generalized osteoarthritis M15.9       Past Medical History:        Diagnosis Date    Allergic rhinitis     Arthritis     Dizziness     Hip pain     Hx of blood clots 2012    PE    Hypertension     Does not currently take any medications    IBS (irritable bowel syndrome)     Neuropathy     Pulmonary embolism (HCC)     Recurrent low back pain     Thyroid disease     enlarged    Tinnitus        Past Surgical History:        Procedure Laterality Date    COLONOSCOPY  10/26/2016    Polyps    FOOT SURGERY Left     TONSILLECTOMY         Social History:    Social History     Tobacco Use    Smoking status: Never Smoker    Smokeless tobacco: Never Used   Substance Use Topics    Alcohol use:  No                                Counseling given: Not Answered      Vital Signs (Current):   Vitals:    07/29/20 1530 08/03/20 0818   BP:  131/82   Pulse:  86   Resp:  12   Temp:  98.2 °F (36.8 °C)   TempSrc:  Temporal   Weight: 225 lb (102.1 kg) 236 lb (107 kg)   Height: 5' 10\" (1.778 m) 5' 10\" (1.778 m)                                              BP Readings from Last 3 Encounters:   08/03/20 131/82   07/28/20 132/82   07/23/20 123/68       NPO Status: Time of last liquid consumption: 0500(sip )                        Time of last solid consumption: 1930                        Date of last liquid consumption: 08/02/20                        Date of last solid food consumption: 08/02/20    BMI:   Wt Readings from Last 3 Encounters:   08/03/20 236 lb (107 kg)   07/28/20 222 lb 6.4 oz (100.9 kg)   07/23/20 225 lb 3.2 oz (102.2 kg)     Body mass index is 33.86 kg/m². CBC:   Lab Results   Component Value Date    WBC 6.0 07/28/2020    RBC 4.63 07/28/2020    HGB 14.2 07/28/2020    HCT 42.5 07/28/2020    MCV 91.7 07/28/2020    RDW 13.7 07/28/2020     07/28/2020       CMP:   Lab Results   Component Value Date     07/28/2020    K 4.8 07/28/2020     07/28/2020    CO2 22 07/28/2020    BUN 12 07/28/2020    CREATININE 0.6 07/28/2020    GFRAA >60 07/28/2020    GFRAA >60 12/26/2012    AGRATIO 1.6 10/01/2018    LABGLOM >60 07/28/2020    GLUCOSE 95 07/28/2020    PROT 7.0 10/01/2018    PROT 7.5 12/24/2012    CALCIUM 10.0 07/28/2020    BILITOT <0.2 10/01/2018    ALKPHOS 78 10/01/2018    AST 20 10/01/2018    ALT 18 10/01/2018       POC Tests: No results for input(s): POCGLU, POCNA, POCK, POCCL, POCBUN, POCHEMO, POCHCT in the last 72 hours.     Coags:   Lab Results   Component Value Date    PROTIME 11.2 12/28/2012    INR 2.1 05/20/2013       HCG (If Applicable): No results found for: PREGTESTUR, PREGSERUM, HCG, HCGQUANT     ABGs: No results found for: PHART, PO2ART, GJZ3PPQ, ZAK4ZFR, BEART, G2GJJBAS     Type & Screen (If Applicable):  No results found for: LABABO, LABRH    Drug/Infectious Status (If Applicable):  No results found for: HIV, HEPCAB    COVID-19 Screening (If Applicable):   Lab Results   Component Value Date    COVID19 Not Detected 07/28/2020         Anesthesia Evaluation  Patient summary reviewed and Nursing notes reviewed  Airway: Mallampati: II  TM distance: >3 FB   Neck ROM: full  Mouth opening: > = 3 FB Dental: normal exam         Pulmonary:Negative Pulmonary ROS and normal exam  breath sounds clear to auscultation                             Cardiovascular:    (+) hypertension:,

## 2020-08-03 NOTE — OP NOTE
[address] [facility] SURGERY  OPERATIVE REPORT    Patient Name: Claudio Espinoza  YOB: 1955  Medical Record Number:  [id number]  Billing Number:  [id number]  Date of Procedure: 8/3/2020  Time: 1000    Pre Operative Diagnoses:   Problem List Items Addressed This Visit     Thyroid goiter - Primary    Relevant Medications    HYDROcodone-acetaminophen (NORCO) 5-325 MG per tablet    Other Relevant Orders    Surgical Pathology      Other Visit Diagnoses     Thyroid nodule        Relevant Orders    Surgical Pathology    Pharyngoesophageal dysphagia        Relevant Orders    Surgical Pathology        Post Operative Diagnoses:    Problem List Items Addressed This Visit     Thyroid goiter - Primary    Relevant Medications    HYDROcodone-acetaminophen ([address]) 5-325 MG per tablet    Other Relevant Orders    Surgical Pathology      Other Visit Diagnoses     Thyroid nodule        Relevant Orders    Surgical Pathology    Pharyngoesophageal dysphagia        Relevant Orders    Surgical Pathology                 Procedure:  1. Left Thyroid Lobectomy         2. Recurrent laryngeal nerve integrity monitoring        Surgeon: Krystina Hamilton DO     Assistant Surgeron: Marquise Vega MD    OR Staff/ Assistant:  Circulator: Alessandra Joshi RN  Surgical Assistant: Kyung Mercado Circulator: Jocelyn Greenwood RN  Scrub Person First: Elidia Marie    Anesthesia:  General anesthesia. Findings:  1) Left thyroid goiter with substernal extensions    Indications: This is a 59 y.o. female with compressive symptoms secondary to left thyroid lobe goiter. Risks and benefits discussed with the patient including alternate treatment options, Informed consent was obtained, the patient elected to proceed with the planned procedure. DETAILS OF PROCEDURE(S):       The patient was brought to the operating room, placed in a supine position, and induced and intubated per anesthesia.  The patient was intubated with a  Xomed nerve integrity monitoring endotracheal tube. Direct laryngoscopy confirmed accurate placement of the EMG contact electrodes to the vocalis muscles of the endolarynx bilaterally. Subdermal ground electrodes were placed and all electrodes hooked up to the nerve monitor, which was turned on and set for continuous laryngeal nerve monitoring for the remainder of the  procedure. Only a short-acting muscle relaxant was used for intubation, no further muscle relaxant given, and no topical laryngeal anesthetic was used. G-tube was passed into the esophagus to allow for easier palpation during the procedure as the left thyroid lobe abuts the esophagus. After confirming the correct patient, procedure, and site using standard timeout technique, the neck was prepped and draped in standard sterile fashion. A horizontalskin incision was made through a previously identified skin crease. Dissection was carried down to the subplatysmal plane. The strap muscles were  in the midline and retracted laterally on the left. The thyroid gland was mobilized medially. Attention was turned to the superior pole, with identification and preservation of the superior laryngeal nerve and cricothyroid muscle medially. The integrity of the nerve was confirmed with the nerve stimulator and monitor. The inferior portion of the left thyroid lobe was mobilized inferiorly and the substernal portion was lifted into the neck. The inferior and superior parathyroid glands were preserved along with their blood supply. The recurrent nerve was followed to its insertion near the cricothyroid membrane, De Oliveira's ligament was sharply transected. The gland was then divided in the midline using harmonic. The thyroid gland was passed off the field and sent for final histologic diagnosis. Hemostasis was obtained. The left recurrent laryngeal nerve stimulated at the end of the case. The sponge count was correct.  The wound was closed in layers. The patient was returned to anesthesia care, awakened, extubated, and taken to the recovery room in good condition. I attest that I was present for and did the entire procedure myself. Dr. Den Hussein assisted in exposure and retraction given significant size of left thyroid lobe with substernal extension. Estimated Blood Loss: less than 50 ml                 Specimens:   ID Type Source Tests Collected by Time Destination   A : LEFT THYROID LOBE Tissue Tissue SURGICAL PATHOLOGY Amy Zuñiga DO 8/3/2020 1047               Drains: None     Complications: There were no complications.

## 2020-08-03 NOTE — PROGRESS NOTES
Preoperative Screening for Elective Surgery/Invasive Procedures While COVID-19 present in the community     Have you tested positive or have been told to self-isolate for COVID-19 like symptoms within the past 28 days?  Do you currently have any of the following symptoms? o Fever >100.0 F or 99.9 F in immunocompromised patients? o New onset cough, shortness of breath or difficulty breathing?  o New onset sore throat, myalgia (muscle aches and pains), headache, loss of taste/smell or diarrhea?  Have you had a potential exposure to COVID-19 within the past 14 days by:  o Close contact with a confirmed case? o Close contact with a healthcare worker,  or essential infrastructure worker (grocery store, [employer], gas station, public utilities or transportation)? o Do you reside in a congregate setting such as; skilled nursing facility, adult home, correctional facility, homeless shelter or other institutional setting?  o Have you had recent travel to a known COVID-19 hotspot? Indicate if the patient has a positive screen by answering yes to one or more of the above questions. Patients who test positive or screen positive prior to surgery or on the day of surgery should be evaluated in conjunction with the surgeon/proceduralist/anesthesiologist to determine the urgency of the procedure.      Answered no to above Alfie Roberto RN

## 2020-08-04 ENCOUNTER — TELEPHONE (OUTPATIENT)
Dept: ENT CLINIC | Age: 65
End: 2020-08-04

## 2020-08-04 NOTE — TELEPHONE ENCOUNTER
Patient's  has a few questions that he would like to know the answer to. If someone could please call him back with these answers at [phone]      How long should she iced it? And how should she clean the build up of Vaseline, with soap and water? Nurse said to put sun block on, they just want to know if that starts now or after it heals? Cough drops with a eucalyptus oil, is she allowed to use those drop? Not sure which Stool soften she needs to use, the patient wants to use her normal medication.

## 2020-08-04 NOTE — TELEPHONE ENCOUNTER
Called patient to go over the information Dr. Marshall Generous asked me to provide to the patient (listed below), spoke to patients  Antonyangela Duke. \"She can use whatever stool softner she would like, any cough drops are fine, she should not use sunblock until cleared by me as discussed- just vaseline or aquaphor until follow up. She should not scrub the incision with anything, when showering let water run over the incision and then pat dry. Do not scrub. She can wipe the incision gently if she feels she has a build up on ointment and then reapply. Ice it for 20 minutes at a time as needed using a towel between the neck and the ice pack. She should have the ice off for at least 20 minutes between using the ice. \"    Antony Duke expressed understanding, I told him to give our office a call if he had any other questions for our office.

## 2020-08-11 ENCOUNTER — OFFICE VISIT (OUTPATIENT)
Dept: ENT CLINIC | Age: 65
End: 2020-08-11

## 2020-08-11 VITALS
TEMPERATURE: 97.2 F | WEIGHT: 225.4 LBS | DIASTOLIC BLOOD PRESSURE: 79 MMHG | HEIGHT: 70 IN | HEART RATE: 81 BPM | BODY MASS INDEX: 32.27 KG/M2 | SYSTOLIC BLOOD PRESSURE: 126 MMHG

## 2020-08-11 PROCEDURE — 99024 POSTOP FOLLOW-UP VISIT: CPT | Performed by: OTOLARYNGOLOGY

## 2020-08-11 NOTE — PROGRESS NOTES
Pee Malone is postop day 8 status post post left hemithyroidectomy for compressive symptoms. Pathology consistent with benign thyroid hyperplasia and nodules. Remarks postop course has been uneventful but did have significant sore throat from intubation. Denies pain or drainage from incision site. Swallowing remains unchanged. Low anterior cervical incision well healing. Sutures removed. No evidence of erythema, hematoma or infection. Mirror examination demonstrates bilateral vocal cord mobility. Follow-up in 5 weeks for final postop.

## 2020-09-01 ENCOUNTER — TELEPHONE (OUTPATIENT)
Dept: ENDOCRINOLOGY | Age: 65
End: 2020-09-01

## 2020-09-01 NOTE — TELEPHONE ENCOUNTER
Pt calling she had her thyroid removed  And would like to know if the doctor wants to see her sooner than her scheduled appt with him 11/6/20. There are no office openings in [place]. Please advise . Also if you call her and phone goes to   They are having issues and you will have to call her back it will not take voicemail's at this time. She will be waiting by phone as much as possible.

## 2020-09-01 NOTE — TELEPHONE ENCOUNTER
Please advise the patient that I would recommend checking her thyroid hormone levels after her surgery . I have ordered labs . [facility]. We would call her with results. She can keep her November vlad for now.

## 2020-09-01 NOTE — TELEPHONE ENCOUNTER
Spoke with pt and advised per Dr. Pao Maradiaga: Please advise the patient that I would recommend checking her thyroid hormone levels after her surgery . I have ordered labs . She can do them at any [facility] lab. We would call her with results. She can keep her November vlad for now. FM 9/1/2020.

## 2020-09-08 DIAGNOSIS — E04.1 LEFT THYROID NODULE: ICD-10-CM

## 2020-09-08 LAB
T4 FREE: 1.1 NG/DL (ref 0.9–1.8)
TSH SERPL DL<=0.05 MIU/L-ACNC: 3.03 UIU/ML (ref 0.27–4.2)

## 2020-09-15 ENCOUNTER — OFFICE VISIT (OUTPATIENT)
Dept: ENT CLINIC | Age: 65
End: 2020-09-15

## 2020-09-15 VITALS
WEIGHT: 227 LBS | RESPIRATION RATE: 16 BRPM | BODY MASS INDEX: 32.5 KG/M2 | TEMPERATURE: 97.1 F | DIASTOLIC BLOOD PRESSURE: 90 MMHG | HEART RATE: 73 BPM | SYSTOLIC BLOOD PRESSURE: 148 MMHG | HEIGHT: 70 IN

## 2020-09-15 PROCEDURE — 99024 POSTOP FOLLOW-UP VISIT: CPT | Performed by: OTOLARYNGOLOGY

## 2020-09-15 NOTE — PROGRESS NOTES
James Saul is a 27-year-old female 6 weeks status post left hemithyroidectomy for compressive goiter. She reports near complete resolution of all symptoms of dysphasia though she does note difficulty in swallowing her cast centimeters tablet. Has been following with her endocrinologist to already obtained repeat thyroid blood work and she was within normal range. Has no complaints today. Discussed scar massage and sun protection. Well-healing low anterior cervical neck incision. 6-week status post left hemithyroidectomy and doing well. She will follow with endocrinology now and return as needed.     Alexsandra Marley, DO  Otolaryngology

## 2020-11-06 ENCOUNTER — OFFICE VISIT (OUTPATIENT)
Dept: ENDOCRINOLOGY | Age: 65
End: 2020-11-06

## 2020-11-06 VITALS
HEART RATE: 83 BPM | BODY MASS INDEX: 32.13 KG/M2 | DIASTOLIC BLOOD PRESSURE: 72 MMHG | SYSTOLIC BLOOD PRESSURE: 120 MMHG | HEIGHT: 70 IN | OXYGEN SATURATION: 99 % | WEIGHT: 224.4 LBS

## 2020-11-06 PROCEDURE — 99213 OFFICE O/P EST LOW 20 MIN: CPT | Performed by: INTERNAL MEDICINE

## 2020-11-06 ASSESSMENT — ENCOUNTER SYMPTOMS
ORTHOPNEA: 0
HEMOPTYSIS: 0
BLURRED VISION: 0
BACK PAIN: 0
PHOTOPHOBIA: 0
DOUBLE VISION: 0
COUGH: 0

## 2020-11-06 NOTE — PROGRESS NOTES
Endocrinology  Chip Lamb M.D. Phone: [phone]   FAX: [phone]       Jaswant Dickson   YOB: 1955    Date of Visit:  11/6/2020    Allergies   Allergen Reactions    Penicillins Hives     Outpatient Medications Marked as Taking for the 11/6/20 encounter (Office Visit) with Lizy Ridley MD   Medication Sig Dispense Refill    gabapentin (NEURONTIN) 600 MG tablet Take 1 tablet by mouth 4 times daily as needed (neuropathy) for up to 30 days. 120 tablet 5    melatonin 3 MG TABS tablet Take 10 mg by mouth daily Take one half tablet nightly       magnesium (MAGNESIUM-OXIDE) 250 MG TABS tablet Take 250 mg by mouth daily      Calcium Carbonate-Vit D-Min 5416-4171 MG-UNIT CHEW Take 600 mg by mouth daily            Vitals:    11/06/20 0953   BP: 120/72   Site: Right Upper Arm   Position: Sitting   Cuff Size: Medium Adult   Pulse: 83   SpO2: 99%   Weight: 224 lb 6.4 oz (101.8 kg)   Height: 5' 10\" (1.778 m)     Body mass index is 32.2 kg/m².      Wt Readings from Last 3 Encounters:   11/06/20 224 lb 6.4 oz (101.8 kg)   09/15/20 227 lb (103 kg)   08/11/20 225 lb 6.4 oz (102.2 kg)     BP Readings from Last 3 Encounters:   11/06/20 120/72   09/15/20 (!) 148/90   08/11/20 126/79        Past Medical History:   Diagnosis Date    Allergic rhinitis     Arthritis     Dizziness     Hip pain     Hx of blood clots 2012    PE    Hypertension     Does not currently take any medications    IBS (irritable bowel syndrome)     Neuropathy     Pulmonary embolism (HCC)     Recurrent low back pain     Thyroid disease     enlarged    Tinnitus      Past Surgical History:   Procedure Laterality Date    COLONOSCOPY  10/26/2016    Polyps    FOOT SURGERY Left     THYROIDECTOMY Left 8/3/2020    LEFT THYROID LOBECTOMY WITH NERVE MONITORING  CPT CODE - 00097 performed by Kenrick Ross DO at [facility], PARTIAL Left 08/2020    TONSILLECTOMY       Family History   Problem Relation Age of Onset    Cancer Father     Heart Disease Sister     Other Sister         Pulmonary Embolism     Social History     Tobacco Use   Smoking Status Never Smoker   Smokeless Tobacco Never Used      Social History     Substance and Sexual Activity   Alcohol Use No       HPI    Kee Mcelroy is a 59 y.o. female who is here for a follow-up for management of goiter. PCP :  Valencia Hernandez MD         Patient has a PMH of hypertension, PE, back pain, neuropathy. She had an MRI in 01/16 which showed thyroid nodules. Patient had Thyroid US done in 02/16 which showed the thyroid gland to be heterogenous with a nodule in left upper lobe. Both lobes heterogenous. Cystic nodule 6-7 mm in right lobe. Left lobe showed 1.7 cm nodule    S/p FNA in 04/16 which showed the nodules to be benign hyperplastic nodule    Nodule has been growing in size over last 3-4 yrs. US in 10/19 showed left lobe nodule has increased in size    Right lobe Nodules are stable and subcentimeter    -FNA of  the dominant left lobe nodule was done given increase in size. It showed the nodule to be benign again on FNA in 11/19     She was having difficulty swallowing    S/p left lobectomy by Dr. Bhavesh Valdes in 08/20    Path report showed benign changes. Difficulty swallowing has improved since surgery. Mother had hypothyroidism. No FH of thyroid cancer  No History of exposure to radiation as a child. Review of Systems   Constitutional: Negative for chills, diaphoresis, fever, malaise/fatigue and weight loss. Eyes: Negative for blurred vision, double vision and photophobia. Respiratory: Negative for cough and hemoptysis. Cardiovascular: Negative for chest pain, palpitations and orthopnea. Genitourinary: Negative for dysuria, flank pain, frequency, hematuria and urgency. Musculoskeletal: Negative for back pain, falls, joint pain, myalgias and neck pain. Skin: Negative for itching and rash.    Neurological: Negative for dizziness, tingling, tremors, sensory change, speech change, focal weakness, seizures, loss of consciousness and headaches. Endo/Heme/Allergies: Negative for environmental allergies and polydipsia. Does not bruise/bleed easily. Psychiatric/Behavioral: Negative for depression, hallucinations, memory loss, substance abuse and suicidal ideas. The patient is not nervous/anxious and does not have insomnia. THYROID ULTRASOUND      2/5/2016      COMPARISON:   MRI 01/15/2016      HISTORY:   Thyroid nodule      FINDINGS:   Right thyroid lobe: 4.9 x 2.3 x 1.9 cm      Left thyroid lobe: 9.4 x 3.8 x 3 cm      Isthmus: 4.6 mm      Thyroid Gland: Thyroid is diffusely enlarged and lobulated in appearance. Nodules: There is a 1.7 cm heterogeneous isoechoic solid nodule with small   cystic components on the left. Cervical lymphadenopathy: No abnormal lymph nodes in the imaged portions of   the neck. Impression   IMPRESSION:   Heterogeneous enlarged thyroid with a focal subtle nodule on the left. EXAMINATION:   THYROID ULTRASOUND       10/19/2017       COMPARISON:   Thyroid ultrasound April 7, 2017 and February 5, 2016.       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 Vascularity is normal.       Nodules:  There appears to be a 2 cm area nodularity within the left thyroid   which is poorly evaluated due to the marked heterogeneity.  This is seen on   the longitudinal view only and is not well visualized on transverse images   and may be artifactual due to the nodularity of the thyroid.  This is overall   similar in appearance compared to prior study.  A 1.1 cm hypoechoic nodule is   seen within the posterior right thyroid.  Several other smaller scattered   areas of nodularity are seen within the thyroid which are subcentimeter.       Cervical lymphadenopathy: No abnormal lymph nodes in the imaged portions of   the neck.           Impression   Enlarged heterogeneous thyroid.  Few slightly more focal nodular appearing   areas are seen within both lobes of thyroid which are overall similar in   appearance compared to prior.         EXAMINATION:   THYROID ULTRASOUND       10/18/2019       COMPARISON:   10/05/2018       HISTORY:   ORDERING SYSTEM PROVIDED HISTORY: Multiple thyroid nodules   TECHNOLOGIST PROVIDED HISTORY:   Reason for exam:->h/o thyroid nodules.  US to be done in 10/19.       Patient reports benign left thyroid biopsy 2 years ago, no record available   in our system.       FINDINGS:   Right thyroid lobe:  4.6 x 3.2 x 2.2 cm       Left thyroid lobe:  6.6 x 4.4 x 3.9 cm       Isthmus:  0.4 cm       Thyroid Gland:  Thyroid gland is inhomogeneous.       Nodules: Thyroid nodules are noted.       NODULE: Left 1       Size: 31 x 32 x 51 mm       Location: Mid/inferior       1.  Composition:  Almost completely solid (2)       2.  Echogenicity:  Isoechoic (1)       3.  Shape:  Wider than tall (0)       4.  Margins:  Smooth (0)       5. Echogenic foci:  Macrocalcifications (1)       ACR TI-RADS total points:  4       ACR TI-RADS risk category: TR 4       No record of prior biopsy available.  Increased in size (previously 43 x 30 x   36 mm).       Recommendation:  Ultrasound-guided biopsy       NODULE: Right 1       Size: 6 x 4 x 7 mm       Location: Superior       1.  Composition:  Almost completely solid (2)       2.  Echogenicity:  Hypoechoic (2)       3.  Shape:  Wider than tall (0)       4.  Margins:  Smooth (0)       5. Echogenic foci:  None (0)       ACR TI-RADS total points:  4       ACR TI-RADS risk category: TR 4       Recommendation:  No follow-up necessary       NODULE: Right 2       Size: 6 x 5 x 7 mm       Location: Mid       1.  Composition:  Solid (2)       2.  Echogenicity:  Hypoechoic (2)       3.  Shape:  Wider than tall (0)       4.  Margins:  Smooth (0)       5. Echogenic foci:  None (0)       ACR TI-RADS total points:  4       ACR TI-RADS risk category: TR 4       Recommendation:  No follow-up necessary       Cervical lymphadenopathy: No abnormal lymph nodes in the imaged portions of   the neck.           Impression   Interval growth of dominant nodule (left 1).  Fine-needle aspiration is   recommended.       No follow-up is recommended of additional, likely benign thyroid nodules. [facility] [address]  [address]                                        Fax [phone]   [phone]   Department of Pathology   FINAL SURGICAL PATHOLOGY REPORT   Patient Name: Sachin Foster        Accession No:  [id number]    Age Sex:   1955    64 Y / F      Location:      [facility]   Account No:   [de-identified]                 Collected:     2020   Med Rec No:    [id number]                Received:      2020   Attend Phys: Jarocho Vo DO            Completed:     2020   Perform Phys: Jarocho Vo DO           Technical processing at [facility], [address] [place] [address]  Phone [phone]     FINAL DIAGNOSIS:     Thyroid, left lobe, lobectomy (34 grams):      - Thyroid tissue with nodular hyperplasia; negative for malignancy.  ROCJO/ROCJO     Assessment/Plan    1. Thyroid nodule    This 59 yrs old female was found to have a 1.7 cm nodule in left lobe. Both lobes heterogenous. Cystic nodule 6-7 mm in right lobe. Left lobe showed 1.7 cm nodule    S/p FNA in  which showed the nodules to be benign hyperplastic nodule    Nodule has been growing in size over last 3-4 yrs.      US in 10/19 showed left lobe nodule has increased in size    Right lobe Nodules are stable and subcentimeter    -FNA of  the dominant left lobe nodule was done given increase in size. It showed the nodule to be benign again on FNA in 11/19     She was having difficulty swallowing    S/p left lobectomy by Dr. Yessica Zhang in 08/20    Path report showed benign changes. Difficulty swallowing has improved since surgery. TSH, Free T4 normal in 09/20     Will recommend she gets her thyroid labs monitored once a year by her PCP and see endocrine as needed. 2. PE On coumadin.

## 2020-12-16 ENCOUNTER — VIRTUAL VISIT (OUTPATIENT)
Dept: FAMILY MEDICINE CLINIC | Age: 65
End: 2020-12-16
Payer: COMMERCIAL

## 2020-12-16 ENCOUNTER — TELEPHONE (OUTPATIENT)
Dept: FAMILY MEDICINE CLINIC | Age: 65
End: 2020-12-16

## 2020-12-16 PROCEDURE — 99442 PR PHYS/QHP TELEPHONE EVALUATION 11-20 MIN: CPT | Performed by: FAMILY MEDICINE

## 2020-12-16 RX ORDER — GABAPENTIN 400 MG/1
CAPSULE ORAL
Qty: 180 CAPSULE | Refills: 1 | Status: SHIPPED | OUTPATIENT
Start: 2020-12-16 | End: 2021-05-27 | Stop reason: SDUPTHER

## 2020-12-16 RX ORDER — GABAPENTIN 600 MG/1
600 TABLET ORAL 4 TIMES DAILY PRN
Qty: 120 TABLET | Refills: 5 | Status: CANCELLED | OUTPATIENT
Start: 2020-12-16 | End: 2021-01-15

## 2020-12-16 RX ORDER — HYDROCORTISONE ACETATE 0.5 %
CREAM (GRAM) TOPICAL
COMMUNITY

## 2020-12-16 RX ORDER — GABAPENTIN 300 MG/1
CAPSULE ORAL
Qty: 360 CAPSULE | Refills: 1 | Status: SHIPPED | OUTPATIENT
Start: 2020-12-16 | End: 2021-05-27 | Stop reason: SDUPTHER

## 2020-12-16 NOTE — TELEPHONE ENCOUNTER
Pt. Is asking for the 'black' label off the flu vaccine be emailed to her. She wants to make sure there is not fetal material in vaccine.

## 2020-12-16 NOTE — PROGRESS NOTES
Lisa Webber is a 72 y.o. female evaluated via telephone on 4/17/2020. Consent:  She and/or health care decision maker is aware that that she may receive a bill for this telephone service, depending on her insurance coverage, and has provided verbal consent to proceed: Yes      Documentation:  I communicated with the patient and/or health care decision maker about:    Neuropathy is stable  Has been trialing new medication times  Requests 400 mg AM/Lunch, 600 mg Dinner/before bed  The 600 mg tablets are large, requests 300 mg tablets  No new AEs, falls, drowsiness, mood changes  Helps with Sxs   (hx of polyneuropathy in previous note from 10/19)    Has prilosec at home, takes with Aleve PRN  Inquires about diclofenac gel for pain     Details of this discussion including any medical advice provided:     1. Polyneuropathy  Appropriate, c/w use  3 mo supply sent to mail in pharmacy  - gabapentin (NEURONTIN) 400 MG capsule; Take 1 tablet with breakfast and with lunch  Dispense: 180 capsule; Refill: 1  - gabapentin (NEURONTIN) 300 MG capsule; Take 2 tablets in the evening and 2 tablets before bedtime  Dispense: 360 capsule; Refill: 1    2. Arthritis  - Glucosamine-Chondroit-Vit C-Mn (GLUCOSAMINE 1500 COMPLEX) CAPS; Take by mouth  - diclofenac sodium (VOLTAREN) 1 % GEL; Apply 2-4 g topically 4 times daily as needed for Pain  Dispense: 100 g; Refill: 1        I affirm this is a Patient Initiated Episode with an Established Patient who has not had a related appointment within my department in the past 7 days or scheduled within the next 24 hours.     Total Time: minutes: 11-20 minutes    Note: not billable if this call serves to triage the patient into an appointment for the relevant concern    Dr. Pauline Crigler, MD  12/16/20

## 2021-03-01 DIAGNOSIS — M19.90 ARTHRITIS: ICD-10-CM

## 2021-03-01 NOTE — TELEPHONE ENCOUNTER
Refill Request     Last Seen: 12/16/2020    Last Written: 12/16/2020 #100 g with 1 refill     Next Appointment:   Future Appointments   Date Time Provider Betzy Patel   4/30/2021  4:30 PM MD VIPIN Candelario   5/6/2021  2:30 PM MD VIPIN Candelario [facility]       Future appointment scheduled      Requested Prescriptions     Pending Prescriptions Disp Refills    diclofenac sodium (VOLTAREN) 1 %  g 1     Sig: Apply 2-4 g topically 4 times daily as needed for Pain

## 2021-05-18 ASSESSMENT — LIFESTYLE VARIABLES
AUDIT-C TOTAL SCORE: INCOMPLETE
AUDIT TOTAL SCORE: INCOMPLETE
HOW OFTEN DO YOU HAVE A DRINK CONTAINING ALCOHOL: NEVER

## 2021-05-18 ASSESSMENT — PATIENT HEALTH QUESTIONNAIRE - PHQ9
2. FEELING DOWN, DEPRESSED OR HOPELESS: 0
1. LITTLE INTEREST OR PLEASURE IN DOING THINGS: 0
SUM OF ALL RESPONSES TO PHQ QUESTIONS 1-9: 0

## 2021-05-27 ENCOUNTER — OFFICE VISIT (OUTPATIENT)
Dept: FAMILY MEDICINE CLINIC | Age: 66
End: 2021-05-27
Payer: COMMERCIAL

## 2021-05-27 VITALS
SYSTOLIC BLOOD PRESSURE: 130 MMHG | WEIGHT: 237 LBS | DIASTOLIC BLOOD PRESSURE: 86 MMHG | OXYGEN SATURATION: 98 % | HEART RATE: 91 BPM | BODY MASS INDEX: 33.93 KG/M2 | HEIGHT: 70 IN

## 2021-05-27 DIAGNOSIS — Z00.00 ROUTINE GENERAL MEDICAL EXAMINATION AT A HEALTH CARE FACILITY: Primary | ICD-10-CM

## 2021-05-27 DIAGNOSIS — I26.99 PULMONARY EMBOLISM, OTHER, UNSPECIFIED CHRONICITY, UNSPECIFIED WHETHER ACUTE COR PULMONALE PRESENT (HCC): ICD-10-CM

## 2021-05-27 DIAGNOSIS — M19.90 ARTHRITIS: ICD-10-CM

## 2021-05-27 DIAGNOSIS — Z53.20 SCREENING MAMMOGRAPHY DECLINED: ICD-10-CM

## 2021-05-27 DIAGNOSIS — G62.9 POLYNEUROPATHY: ICD-10-CM

## 2021-05-27 DIAGNOSIS — E55.9 VITAMIN D DEFICIENCY, UNSPECIFIED: ICD-10-CM

## 2021-05-27 PROCEDURE — G0438 PPPS, INITIAL VISIT: HCPCS | Performed by: FAMILY MEDICINE

## 2021-05-27 RX ORDER — GABAPENTIN 400 MG/1
CAPSULE ORAL
Qty: 180 CAPSULE | Refills: 1 | Status: SHIPPED | OUTPATIENT
Start: 2021-05-27 | End: 2021-08-12

## 2021-05-27 RX ORDER — GABAPENTIN 300 MG/1
CAPSULE ORAL
Qty: 360 CAPSULE | Refills: 1 | Status: SHIPPED | OUTPATIENT
Start: 2021-05-27 | End: 2021-08-12

## 2021-05-27 RX ORDER — ASPIRIN 81 MG/1
81 TABLET, CHEWABLE ORAL DAILY
COMMUNITY

## 2021-05-27 NOTE — PATIENT INSTRUCTIONS
Personalized Preventive Plan for Iqra Mancilla - 5/27/2021  Medicare offers a range of preventive health benefits. Some of the tests and screenings are paid in full while other may be subject to a deductible, co-insurance, and/or copay. Some of these benefits include a comprehensive review of your medical history including lifestyle, illnesses that may run in your family, and various assessments and screenings as appropriate. After reviewing your medical record and screening and assessments performed today your provider may have ordered immunizations, labs, imaging, and/or referrals for you. A list of these orders (if applicable) as well as your Preventive Care list are included within your After Visit Summary for your review. Other Preventive Recommendations:    · A preventive eye exam performed by an eye specialist is recommended every 1-2 years to screen for glaucoma; cataracts, macular degeneration, and other eye disorders. · A preventive dental visit is recommended every 6 months. · Try to get at least 150 minutes of exercise per week or 10,000 steps per day on a pedometer . · Order or download the FREE \"Exercise & Physical Activity: Your Everyday Guide\" from [employer]. Call 7-671.771.4561 or search [employer] online. · You need 0531-7377 mg of calcium and 6547-4694 IU of vitamin D per day. It is possible to meet your calcium requirement with diet alone, but a vitamin D supplement is usually necessary to meet this goal.  · When exposed to the sun, use a sunscreen that protects against both UVA and UVB radiation with an SPF of 30 or greater. Reapply every 2 to 3 hours or after sweating, drying off with a towel, or swimming. · Always wear a seat belt when traveling in a car. Always wear a helmet when riding a bicycle or motorcycle.

## 2021-05-27 NOTE — PROGRESS NOTES
Date    COLONOSCOPY  10/26/2016    Polyps    FOOT SURGERY Left     THYROIDECTOMY Left 8/3/2020    LEFT THYROID LOBECTOMY WITH NERVE MONITORING  CPT CODE - 10494 performed by Yinka Sood DO at [facility], PARTIAL Left 08/2020    TONSILLECTOMY           Family History   Problem Relation Age of Onset    Cancer Father     Heart Disease Sister     Other Sister         Pulmonary Embolism       CareTeam (Including outside providers/suppliers regularly involved in providing care):   Patient Care Team:  Kathy Marquez MD as PCP - General (Family Medicine)  Kathy Marquez MD as PCP - [facility]  Paula Christy DO as Consulting Physician (Orthopedic Surgery)  Sandra Tripp MD as Consulting Physician (Physical Medicine and Rehab)  John Soares MD (Endocrinology)    Wt Readings from Last 3 Encounters:   05/27/21 237 lb (107.5 kg)   11/06/20 224 lb 6.4 oz (101.8 kg)   09/15/20 227 lb (103 kg)     Vitals:    05/27/21 1455   BP: 130/86   Site: Left Upper Arm   Position: Sitting   Cuff Size: Large Adult   Pulse: 91   SpO2: 98%   Weight: 237 lb (107.5 kg)   Height: 5' 10\" (1.778 m)     Body mass index is 34.01 kg/m². Based upon direct observation of the patient, evaluation of cognition reveals recent and remote memory intact.     General Appearance: alert and oriented to person, place and time, well developed and well- nourished, in no acute distress  Skin: warm and dry, no rash or erythema  Head: normocephalic and atraumatic  Eyes: pupils equal, round, and reactive to light, extraocular eye movements intact, conjunctivae normal  Neck: supple and non-tender without mass, no thyromegaly or thyroid nodules, no cervical lymphadenopathy  Pulmonary/Chest: clear to auscultation bilaterally- no wheezes, rales or rhonchi, normal air movement, no respiratory distress  Cardiovascular: normal rate, regular rhythm, normal S1 and S2, no murmurs, rubs, clicks, or gallops, distal pulses intact, no carotid bruits  Abdomen: soft, non-tender, non-distended, normal bowel sounds, no masses or organomegaly  Extremities: no cyanosis, clubbing or edema  Musculoskeletal: normal range of motion, no joint swelling, deformity or tenderness  Neurologic: reflexes normal and symmetric, no cranial nerve deficit, gait, coordination and speech normal    Patient's complete Health Risk Assessment and screening values have been reviewed and are found in Flowsheets. The following problems were reviewed today and where indicated follow up appointments were made and/or referrals ordered. Positive Risk Factor Screenings with Interventions:            General Health and ACP:  General  In general, how would you say your health is?: Very Good  In the past 7 days, have you experienced any of the following?  New or Increased Pain, New or Increased Fatigue, Loneliness, Social Isolation, Stress or Anger?: None of These  Do you get the social and emotional support that you need?: Yes  Do you have a Living Will?: (!) No  Advance Directives     Power of [address] [place] [address] Will ACP-Advance Directive ACP-Power of     Not on File Not on File Not on File Not on File      General Health Risk Interventions:  · No Living Will: Advance Care Planning addressed with patient today    Health Habits/Nutrition:  Health Habits/Nutrition  Do you exercise for at least 20 minutes 2-3 times per week?: Yes  Have you lost any weight without trying in the past 3 months?: No  Do you eat only one meal per day?: No  Have you seen the dentist within the past year?: Yes  Body mass index: (!) 34  Health Habits/Nutrition Interventions:  · Nutritional issues:  educational materials for healthy, well-balanced diet provided       Personalized Preventive Plan   Current Health Maintenance Status  Immunization History   Administered Date(s) Administered    Hepatitis A Adult ([place]) 10/31/2018    Influenza Vaccine, unspecified formulation 10/10/2016    Influenza, Quadv, IM, PF (6 mo and older Fluzone, Flulaval, Fluarix, and 3 yrs and older Afluria) 10/01/2018, 10/11/2019    Tdap (Boostrix, Adacel) 10/20/2011    Zoster Live (Zostavax) 11/29/2013    Zoster Recombinant (Shingrix) 09/13/2019, 12/27/2019        Health Maintenance   Topic Date Due    COVID-19 Vaccine (1) Never done    Cervical cancer screen  Never done    Breast cancer screen  04/22/2016    Pneumococcal 65+ years Vaccine (1 of 1 - PPSV23) Never done   [employer] Visit (AWV)  Never done    Potassium monitoring  07/28/2021    Creatinine monitoring  07/28/2021    Flu vaccine (Season Ended) 09/01/2021    DTaP/Tdap/Td vaccine (2 - Td) 10/20/2021    Lipid screen  10/01/2023    Colon cancer screen colonoscopy  11/08/2024    DEXA (modify frequency per FRAX score)  Completed    Shingles Vaccine  Completed    Hepatitis C screen  Completed    HIV screen  Completed    Hepatitis A vaccine  Aged Out    Hepatitis B vaccine  Aged Out    Hib vaccine  Aged Out    Meningococcal (ACWY) vaccine  Aged Out     Recommendations for LIQVID Due: see orders and patient instructions/AVS.  . Recommended screening schedule for the next 5-10 years is provided to the patient in written form: see Patient Keren Ward was seen today for medicare awv. Diagnoses and all orders for this visit:    Routine general medical examination at a health care facility  -     Comprehensive Metabolic Panel; Future  -     CBC Auto Differential; Future  -     Hemoglobin A1C; Future  -     Lipid Panel; Future  -     Vitamin D 25 Hydroxy; Future  -     TSH with Reflex; Future    Vitamin D deficiency, unspecified   -     Vitamin D 25 Hydroxy; Future    Polyneuropathy  -     gabapentin (NEURONTIN) 300 MG capsule; Take 2 tablets in the evening and 2 tablets before bedtime  -     gabapentin (NEURONTIN) 400 MG capsule;  Take 1 tablet with breakfast and with lunch    Arthritis  -     diclofenac sodium (VOLTAREN) 1 % GEL; Apply 2-4 g topically 4 times daily as needed for Pain    Screening mammography declined    Pulmonary embolism, other, unspecified chronicity, unspecified whether acute cor pulmonale present ([address]  No new concerns       Will need CS agreement +/- UDS at next visit   Deferred DEXA scan until next year  Refused all vaccines due    Breast cancer screening modalities discussed in detail. Pt refuses/defers any further mammograms. Risks of this discussed, including progressive cancer, deformity, pain, death. Pt understands risks and wishes to proceed. Discussed referral to Breast center if different modalities desired in the future.

## 2021-07-15 DIAGNOSIS — Z00.00 ROUTINE GENERAL MEDICAL EXAMINATION AT A HEALTH CARE FACILITY: ICD-10-CM

## 2021-07-15 DIAGNOSIS — E55.9 VITAMIN D DEFICIENCY, UNSPECIFIED: ICD-10-CM

## 2021-07-15 LAB
A/G RATIO: 1.7 (ref 1.1–2.2)
ALBUMIN SERPL-MCNC: 4.3 G/DL (ref 3.4–5)
ALP BLD-CCNC: 78 U/L (ref 40–129)
ALT SERPL-CCNC: 16 U/L (ref 10–40)
ANION GAP SERPL CALCULATED.3IONS-SCNC: 12 MMOL/L (ref 3–16)
AST SERPL-CCNC: 19 U/L (ref 15–37)
BASOPHILS ABSOLUTE: 0 K/UL (ref 0–0.2)
BASOPHILS RELATIVE PERCENT: 0.9 %
BILIRUB SERPL-MCNC: 0.3 MG/DL (ref 0–1)
BUN BLDV-MCNC: 15 MG/DL (ref 7–20)
CALCIUM SERPL-MCNC: 9 MG/DL (ref 8.3–10.6)
CHLORIDE BLD-SCNC: 106 MMOL/L (ref 99–110)
CHOLESTEROL, TOTAL: 206 MG/DL (ref 0–199)
CO2: 25 MMOL/L (ref 21–32)
CREAT SERPL-MCNC: 0.6 MG/DL (ref 0.6–1.2)
EOSINOPHILS ABSOLUTE: 0.2 K/UL (ref 0–0.6)
EOSINOPHILS RELATIVE PERCENT: 3.9 %
GFR AFRICAN AMERICAN: >60
GFR NON-AFRICAN AMERICAN: >60
GLOBULIN: 2.6 G/DL
GLUCOSE BLD-MCNC: 86 MG/DL (ref 70–99)
HCT VFR BLD CALC: 41.8 % (ref 36–48)
HDLC SERPL-MCNC: 60 MG/DL (ref 40–60)
HEMOGLOBIN: 14 G/DL (ref 12–16)
LDL CHOLESTEROL CALCULATED: 126 MG/DL
LYMPHOCYTES ABSOLUTE: 1.9 K/UL (ref 1–5.1)
LYMPHOCYTES RELATIVE PERCENT: 39.1 %
MCH RBC QN AUTO: 30.4 PG (ref 26–34)
MCHC RBC AUTO-ENTMCNC: 33.6 G/DL (ref 31–36)
MCV RBC AUTO: 90.6 FL (ref 80–100)
MONOCYTES ABSOLUTE: 0.3 K/UL (ref 0–1.3)
MONOCYTES RELATIVE PERCENT: 6.5 %
NEUTROPHILS ABSOLUTE: 2.4 K/UL (ref 1.7–7.7)
NEUTROPHILS RELATIVE PERCENT: 49.6 %
PDW BLD-RTO: 14 % (ref 12.4–15.4)
PLATELET # BLD: 202 K/UL (ref 135–450)
PMV BLD AUTO: 8.7 FL (ref 5–10.5)
POTASSIUM SERPL-SCNC: 4.7 MMOL/L (ref 3.5–5.1)
RBC # BLD: 4.61 M/UL (ref 4–5.2)
SODIUM BLD-SCNC: 143 MMOL/L (ref 136–145)
TOTAL PROTEIN: 6.9 G/DL (ref 6.4–8.2)
TRIGL SERPL-MCNC: 99 MG/DL (ref 0–150)
TSH REFLEX: 3.07 UIU/ML (ref 0.27–4.2)
VITAMIN D 25-HYDROXY: 36.7 NG/ML
VLDLC SERPL CALC-MCNC: 20 MG/DL
WBC # BLD: 4.9 K/UL (ref 4–11)

## 2021-07-16 LAB
ESTIMATED AVERAGE GLUCOSE: 114 MG/DL
HBA1C MFR BLD: 5.6 %

## 2021-08-11 DIAGNOSIS — G62.9 POLYNEUROPATHY: ICD-10-CM

## 2021-08-12 RX ORDER — GABAPENTIN 300 MG/1
CAPSULE ORAL
Qty: 360 CAPSULE | Refills: 1 | Status: SHIPPED | OUTPATIENT
Start: 2021-08-12 | End: 2021-11-04 | Stop reason: SDUPTHER

## 2021-08-12 RX ORDER — GABAPENTIN 400 MG/1
CAPSULE ORAL
Qty: 180 CAPSULE | Refills: 1 | Status: SHIPPED | OUTPATIENT
Start: 2021-08-12 | End: 2021-11-04 | Stop reason: SDUPTHER

## 2021-08-12 NOTE — TELEPHONE ENCOUNTER
Refill Request     Last Seen: Last Seen Department: 5/27/2021  Last Seen by PCP: 5/27/2021    Last Written: 5/27/21    Next Appointment:   Future Appointments   Date Time Provider Betzy Amanda   10/1/2021 11:00 AM MD VIPIN Matias  [place] - DYBENJA   11/4/2021 11:00 AM MD VIPIN Matias  [place] - [facility]URICIO   6/1/2022 11:00 AM MD VIPIN Matias  [place] - DYD       Future appointment scheduled      Requested Prescriptions     Pending Prescriptions Disp Refills    gabapentin (NEURONTIN) 400 MG capsule [Pharmacy Med Name: GABAPENTIN CAP 400MG] 180 capsule 1     Sig: TAKE 1 CAPSULE WITH        BREAKFAST AND LUNCH    gabapentin (NEURONTIN) 300 MG capsule [Pharmacy Med Name: GABAPENTIN CAP 300MG] 360 capsule 1     Sig: TAKE 2 CAPSULES IN THE     EVENING AND 2 CAPSULES     BEFORE BEDTIME

## 2021-10-01 ENCOUNTER — OFFICE VISIT (OUTPATIENT)
Dept: FAMILY MEDICINE CLINIC | Age: 66
End: 2021-10-01
Payer: COMMERCIAL

## 2021-10-01 VITALS
WEIGHT: 249.8 LBS | DIASTOLIC BLOOD PRESSURE: 88 MMHG | SYSTOLIC BLOOD PRESSURE: 144 MMHG | HEART RATE: 89 BPM | OXYGEN SATURATION: 98 % | BODY MASS INDEX: 35.84 KG/M2

## 2021-10-01 DIAGNOSIS — M25.60 MORNING STIFFNESS OF JOINTS: ICD-10-CM

## 2021-10-01 DIAGNOSIS — Z12.4 CERVICAL CANCER SCREENING: Primary | ICD-10-CM

## 2021-10-01 DIAGNOSIS — Z12.31 ENCOUNTER FOR SCREENING MAMMOGRAM FOR MALIGNANT NEOPLASM OF BREAST: ICD-10-CM

## 2021-10-01 DIAGNOSIS — Z11.51 ENCOUNTER FOR SCREENING FOR HUMAN PAPILLOMAVIRUS (HPV): ICD-10-CM

## 2021-10-01 PROCEDURE — 99213 OFFICE O/P EST LOW 20 MIN: CPT | Performed by: FAMILY MEDICINE

## 2021-10-01 RX ORDER — GABAPENTIN 100 MG/1
CAPSULE ORAL
Qty: 60 CAPSULE | Refills: 1 | Status: SHIPPED | OUTPATIENT
Start: 2021-10-01 | End: 2021-11-04 | Stop reason: SDUPTHER

## 2021-10-01 SDOH — ECONOMIC STABILITY: FOOD INSECURITY: WITHIN THE PAST 12 MONTHS, YOU WORRIED THAT YOUR FOOD WOULD RUN OUT BEFORE YOU GOT MONEY TO BUY MORE.: NEVER TRUE

## 2021-10-01 SDOH — ECONOMIC STABILITY: FOOD INSECURITY: WITHIN THE PAST 12 MONTHS, THE FOOD YOU BOUGHT JUST DIDN'T LAST AND YOU DIDN'T HAVE MONEY TO GET MORE.: NEVER TRUE

## 2021-10-01 ASSESSMENT — ENCOUNTER SYMPTOMS
ABDOMINAL PAIN: 0
COLOR CHANGE: 0
ABDOMINAL DISTENTION: 0
VOMITING: 0
BACK PAIN: 0
DIARRHEA: 0
CONSTIPATION: 0
NAUSEA: 0
SHORTNESS OF BREATH: 0

## 2021-10-01 ASSESSMENT — SOCIAL DETERMINANTS OF HEALTH (SDOH): HOW HARD IS IT FOR YOU TO PAY FOR THE VERY BASICS LIKE FOOD, HOUSING, MEDICAL CARE, AND HEATING?: NOT HARD AT ALL

## 2021-10-01 NOTE — PATIENT INSTRUCTIONS
Patient Education        Learning About Cervical Cancer Screening  What is a cervical cancer screening test?     The cervix is the lower part of the uterus that opens into the vagina. Cervical cancer screening tests check the cells on the cervix for changes that could lead to cancer. Two tests can be used to screen for cervical cancer. They may be used alone or together. A Pap test.   This test looks for changes in the cells of the cervix. Some of these cell changes could lead to cancer. A human papillomavirus (HPV) test.   This test looks for the HPV virus. Some high-risk types of HPV can cause cell changes that could lead to cervical cancer. When should you have a screening test?  If you have a cervix, you may need cervical cancer screening. Screening will depend on many things. Ages 24 to 34  Screening options for these ages include:  · A Pap test. If your results are normal, you can wait 3 years to have another test.  · An HPV test beginning at age 22. If your results are negative, you can wait 5 years to have another test.  Ages 27 to 59  Screening options for these ages include:  · A Pap test. If your results are normal, you can wait 3 years to have another test.  · An HPV test. If your results are negative, you can wait 5 years to have another test.  · A Pap test and an HPV test. If your results are normal, you can wait 5 years to be tested again. Ages 72 and older  If you are age 72 or older and you've always had normal screening results, you may not need screening. Talk to your doctor. What do the results of cervical cancer screening mean? Your test results may be normal. Or the results may show minor or serious changes to the cells on your cervix. Minor changes may go away on their own, especially if you are younger than 30.   You may have an abnormal test because you have an infection of the vagina or cervix or because you have low estrogen levels after menopause that are causing the cells to change. If you have a high-risk type of human papillomavirus (HPV) or cell changes that could turn into cancer, you may need more tests. Your doctor may suggest that you wait to be retested. Or you may need to have a colposcopy or treatment right away. Your doctor will recommend a follow-up plan based on your results and your age. Follow-up care is a key part of your treatment and safety. Be sure to make and go to all appointments, and call your doctor if you are having problems. It's also a good idea to know your test results and keep a list of the medicines you take. Where can you learn more? Go to https://Arctic Diagnosticspe117go.IPWireless. org and sign in to your Zample account. Enter P919 in the Producteev box to learn more about \"Learning About Cervical Cancer Screening. \"     If you do not have an account, please click on the \"Sign Up Now\" link. Current as of: December 17, 2020               Content Version: 13.0  © 2006-2021 [employer]. Care instructions adapted under license by [facility] ([facility]). If you have questions about a medical condition or this instruction, always ask your healthcare professional. [place] [address] any warranty or liability for your use of this information. Patient Education        Learning About Breast Cancer Screening  What is breast cancer screening? Breast cancer occurs when cells that are not normal grow in one or both of your breasts. Screening tests can help find breast cancer early. Cancer is easier to treat when it's found early. Having concerns about breast cancer is common. That's why it's important to talk with your doctor about when to start and how often to get screened for breast cancer. How is breast cancer screening done? Several screening tests can be used to check for breast cancer. Mammograms. These tests check for signs of cancer using X-rays.  They can show tumors that are too small for you or your doctor to feel. During a mammogram, a machine squeezes your breasts to make them flatter and easier to X-ray. At least two pictures are taken of each breast. One is taken from the top and one from the side. 3-D mammograms. These tests are also called digital breast tomosynthesis. Your breast is positioned on a flat plate. A top plate is pressed against your breast to keep it in position. The X-ray arm then moves in an arc above the breast and takes many pictures. A computer uses these X-rays to create a three-dimensional image. Clinical breast exam.   In this exam, your doctor carefully feels your breasts and under your arms to check for lumps or other changes. Who should be screened for breast cancer? Experts agree that mammograms are the best screening test for people at average risk of breast cancer. But they don't all agree on the age at which screening should start. And they don't agree on whether it's better to be screened every year or every two years. Here are some of the recommendations from experts:  · Start by age 36 and have a mammogram each year. · Start at age 39 and have a mammogram each year. · Start at age 48 and have a mammogram every 2 years. When to stop having mammograms is another decision. You and your doctor can decide on the right age to start and stop screening based on your personal preferences and overall health. What is your risk for breast cancer? If you don't already know your risk of breast cancer, you can ask your doctor about it. You can also look it up at www.cancer.gov/bcrisktool/. If your doctor says that you have a high or very high risk, ask about ways to reduce your risk. These could include getting extra screening, taking medicine, or having surgery. If you have a strong family history of breast cancer, ask your doctor about genetic testing. What steps can you take to stay healthy?   Some things that increase your risk of breast cancer, such as your age and being female, cannot be controlled. But you can do some things to stay as healthy as you can. · Learn what your breasts normally look and feel like. If you notice any changes, tell your doctor. · If you drink alcohol, limit how much you drink. Any amount of alcohol may increase your risk for some types of cancer. · If you smoke, quit. When you quit smoking, you lower your chances of getting many types of cancer. You can also do your best to eat well, be active, and stay at a healthy weight. Eating healthy foods and being active every day, as well as staying at a healthy weight, may help prevent cancer. Where can you learn more? Go to https://ProfitectpeReliable Tire Disposal.OR Productivity. org and sign in to your linkedFA account. Enter N787 in the MutualMind box to learn more about \"Learning About Breast Cancer Screening. \"     If you do not have an account, please click on the \"Sign Up Now\" link. Current as of: December 17, 2020               Content Version: 13.0  © 2006-2021 Healthwise, Incorporated. Care instructions adapted under license by [facility] ([facility] [place]). If you have questions about a medical condition or this instruction, always ask your healthcare professional. [place] [address] any warranty or liability for your use of this information.

## 2021-10-05 LAB
HPV COMMENT: NORMAL
HPV TYPE 16: NOT DETECTED
HPV TYPE 18: NOT DETECTED
HPVOH (OTHER TYPES): NOT DETECTED

## 2021-11-04 ENCOUNTER — OFFICE VISIT (OUTPATIENT)
Dept: FAMILY MEDICINE CLINIC | Age: 66
End: 2021-11-04
Payer: COMMERCIAL

## 2021-11-04 VITALS
DIASTOLIC BLOOD PRESSURE: 80 MMHG | HEART RATE: 76 BPM | OXYGEN SATURATION: 97 % | SYSTOLIC BLOOD PRESSURE: 130 MMHG | WEIGHT: 244 LBS | BODY MASS INDEX: 35.01 KG/M2

## 2021-11-04 DIAGNOSIS — I10 ESSENTIAL HYPERTENSION: Primary | ICD-10-CM

## 2021-11-04 DIAGNOSIS — Z53.20 MAMMOGRAM DECLINED: ICD-10-CM

## 2021-11-04 DIAGNOSIS — M19.90 ARTHRITIS: ICD-10-CM

## 2021-11-04 DIAGNOSIS — G62.9 POLYNEUROPATHY: ICD-10-CM

## 2021-11-04 PROCEDURE — 99214 OFFICE O/P EST MOD 30 MIN: CPT | Performed by: FAMILY MEDICINE

## 2021-11-04 RX ORDER — GABAPENTIN 100 MG/1
CAPSULE ORAL
Qty: 180 CAPSULE | Refills: 1 | Status: SHIPPED | OUTPATIENT
Start: 2021-11-04 | End: 2022-03-18 | Stop reason: SDUPTHER

## 2021-11-04 RX ORDER — GABAPENTIN 400 MG/1
CAPSULE ORAL
Qty: 180 CAPSULE | Refills: 1 | Status: SHIPPED | OUTPATIENT
Start: 2021-11-04 | End: 2022-03-18 | Stop reason: SDUPTHER

## 2021-11-04 RX ORDER — GABAPENTIN 300 MG/1
CAPSULE ORAL
Qty: 360 CAPSULE | Refills: 1 | Status: SHIPPED | OUTPATIENT
Start: 2021-11-04 | End: 2022-03-18 | Stop reason: SDUPTHER

## 2021-11-04 ASSESSMENT — ENCOUNTER SYMPTOMS
VOMITING: 0
NAUSEA: 0
COLOR CHANGE: 0
ABDOMINAL PAIN: 0
SHORTNESS OF BREATH: 0
BACK PAIN: 1

## 2021-11-04 NOTE — PATIENT INSTRUCTIONS
to postpone PPSV23 vaccination to a future visit. People with minor illnesses, such as a cold, may be vaccinated. People who are moderately or severely ill should usually wait until they recover before getting PPSV23. Your health care provider can give you more information. Risks of a vaccine reaction  · Redness or pain where the shot is given, feeling tired, fever, or muscle aches can happen after PPSV23. People sometimes faint after medical procedures, including vaccination. Tell your provider if you feel dizzy or have vision changes or ringing in the ears. As with any medicine, there is a very remote chance of a vaccine causing a severe allergic reaction, other serious injury, or death. What if there is a serious problem? An allergic reaction could occur after the vaccinated person leaves the clinic. If you see signs of a severe allergic reaction (hives, swelling of the face and throat, difficulty breathing, a fast heartbeat, dizziness, or weakness), call 9-1-1 and get the person to the nearest hospital.  For other signs that concern you, call your health care provider. Adverse reactions should be reported to the Vaccine Adverse Event Reporting System (VAERS). Your health care provider will usually file this report, or you can do it yourself. Visit the VAERS website at www.vaers. hhs.gov at www.vaers. hhs.gov or call 6-700.556.5732. VAERS is only for reporting reactions, and VAERS staff do not give medical advice. How can I learn more? · Ask your health care provider. · Call your local or state health department. · Contact the Centers for Disease Control and Prevention (CDC):  ? Call 8-316.727.4230 (1-800-CDC-INFO) or  ? Visit CDC's website at www.cdc.gov/vaccines  Vaccine Information Statement  PPSV23 Vaccine  10/30/2019  [facility] for Disease Control and Prevention  Many Vaccine Information Statements are available in Malaysian and other languages.  See www.immunize.org/vis. Hojas de información Sobre Vacunas están disponibles en español y en muchos otros idiomas. Visite Stefani.si. Care instructions adapted under license by [facility] ([facility]). If you have questions about a medical condition or this instruction, always ask your healthcare professional. [address] any warranty or liability for your use of this information.

## 2021-11-04 NOTE — PROGRESS NOTES
11/4/2021    This is a 72 y.o. female who presents for  Chief Complaint   Patient presents with    6 Month Follow-Up    Hypertension       HPI:       HTN  No medications  Monitors at home: 120/70s  No acute concerning Sxs: No CP, SOB, palpitations, dizziness, HA, etc.   Tries to stay relaxed when coming in here for readings    Doing well on Gabapentin changes/increases  No AEs  Requests refills to the mail in pharmacy    Defers any vaccines  Declines mammogram and DEXA scans today     Past Medical History:   Diagnosis Date    Allergic rhinitis     Arthritis     Dizziness     Hip pain     Hx of blood clots 2012    PE    Hypertension     Does not currently take any medications    IBS (irritable bowel syndrome)     Neuropathy     Pulmonary embolism (HCC)     Recurrent low back pain     Thyroid disease     enlarged    Tinnitus        Past Surgical History:   Procedure Laterality Date    COLONOSCOPY  10/26/2016    Polyps    FOOT SURGERY Left     THYROIDECTOMY Left 8/3/2020    LEFT THYROID LOBECTOMY WITH NERVE MONITORING  CPT CODE - 79850 performed by Ashlyn Garcia DO at [address] [place] [address], PARTIAL Left 08/2020    TONSILLECTOMY         Social History     Socioeconomic History    Marital status:      Spouse name: Not on file    Number of children: 2    Years of education: Not on file    Highest education level: Not on file   Occupational History    Not on file   Tobacco Use    Smoking status: Never Smoker    Smokeless tobacco: Never Used   Vaping Use    Vaping Use: Never used   Substance and Sexual Activity    Alcohol use: No    Drug use: No    Sexual activity: Yes     Partners: Male     Comment: 2 cups /day   Other Topics Concern    Not on file   Social History Narrative    Not on file     Social Determinants of Health     Financial Resource Strain: Low Risk     Difficulty of Paying Living Expenses: Not hard at all   Food Insecurity: No Food Insecurity    Worried About Running Out of Food in the Last Year: Never true    Ran Out of Food in the Last Year: Never true   Transportation Needs: No Transportation Needs    Lack of Transportation (Medical): No    Lack of Transportation (Non-Medical): No   Physical Activity:     Days of Exercise per Week:     Minutes of Exercise per Session:    Stress:     Feeling of Stress :    Social Connections:     Frequency of Communication with Friends and Family:     Frequency of Social Gatherings with Friends and Family:     Attends [employer]ism Services:     Active Member of Clubs or Organizations:     Attends Club or Organization Meetings:     Marital Status:    Intimate Partner Violence:     Fear of Current or Ex-Partner:     Emotionally Abused:     Physically Abused:     Sexually Abused:        Family History   Problem Relation Age of Onset    Cancer Father     Heart Disease Sister     Other Sister         Pulmonary Embolism       Current Outpatient Medications   Medication Sig Dispense Refill    diclofenac sodium (VOLTAREN) 1 % GEL Apply 2-4 g topically 4 times daily as needed for Pain 100 g 1    gabapentin (NEURONTIN) 100 MG capsule Take 1 tablet by mouth every morning and at lunch, with the 400 mg tablet 180 capsule 1    gabapentin (NEURONTIN) 400 MG capsule TAKE 1 CAPSULE WITH        BREAKFAST AND LUNCH 180 capsule 1    gabapentin (NEURONTIN) 300 MG capsule TAKE 2 CAPSULES IN THE     EVENING AND 2 CAPSULES     BEFORE BEDTIME 360 capsule 1    aspirin 81 MG chewable tablet Take 81 mg by mouth daily      Glucosamine-Chondroit-Vit C-Mn (GLUCOSAMINE 1500 COMPLEX) CAPS Take by mouth      melatonin 3 MG TABS tablet Take 10 mg by mouth daily Take one half tablet nightly       magnesium (MAGNESIUM-OXIDE) 250 MG TABS tablet Take 250 mg by mouth daily      Calcium Carbonate-Vit D-Min 9470-1628 MG-UNIT CHEW Take 600 mg by mouth daily        No current facility-administered medications for this visit.        Immunization History Administered Date(s) Administered    Hepatitis A Adult (Vaqta) 10/31/2018    Influenza Vaccine, unspecified formulation 10/10/2016    Influenza, Quadv, IM, PF (6 mo and older Fluzone, Flulaval, Fluarix, and 3 yrs and older Afluria) 10/01/2018, 10/11/2019    Tdap (Boostrix, Adacel) 10/20/2011    Zoster Live (Zostavax) 11/29/2013    Zoster Recombinant (Shingrix) 09/13/2019, 12/27/2019       Allergies   Allergen Reactions    Penicillins Hives       Review of Systems   Constitutional: Negative for activity change, chills, diaphoresis, fatigue, fever and unexpected weight change. Respiratory: Negative for shortness of breath. Cardiovascular: Negative for chest pain and leg swelling. Gastrointestinal: Negative for abdominal pain, nausea and vomiting. Genitourinary: Negative for difficulty urinating. Musculoskeletal: Positive for arthralgias, back pain, gait problem and myalgias. Negative for joint swelling, neck pain and neck stiffness. Skin: Negative for color change, pallor, rash and wound. Neurological: Negative for weakness, numbness and headaches. Psychiatric/Behavioral: Negative for dysphoric mood and sleep disturbance. The patient is not nervous/anxious. /80 (Site: Left Upper Arm, Position: Sitting, Cuff Size: Large Adult)   Pulse 76   Wt 244 lb (110.7 kg)   LMP  (LMP Unknown)   SpO2 97%   BMI 35.01 kg/m²     Physical Exam  Vitals and nursing note reviewed. Constitutional:       General: She is not in acute distress. Appearance: She is well-developed. She is not diaphoretic. HENT:      Head: Normocephalic and atraumatic. Eyes:      Conjunctiva/sclera: Conjunctivae normal.      Pupils: Pupils are equal, round, and reactive to light. Neck:      Vascular: No JVD. Cardiovascular:      Rate and Rhythm: Normal rate and regular rhythm. Heart sounds: Normal heart sounds. No murmur heard. No friction rub. No gallop.     Pulmonary:      Effort: Pulmonary effort is normal. No respiratory distress. Breath sounds: Normal breath sounds. No wheezing or rales. Chest:      Chest wall: No tenderness. Abdominal:      Palpations: Abdomen is soft. Tenderness: There is no abdominal tenderness. Musculoskeletal:         General: Normal range of motion. Cervical back: Normal range of motion and neck supple. Skin:     General: Skin is warm and dry. Capillary Refill: Capillary refill takes 2 to 3 seconds. Findings: No erythema. Neurological:      Mental Status: She is alert and oriented to person, place, and time. Psychiatric:         Behavior: Behavior normal.         Thought Content: Thought content normal.         Judgment: Judgment normal.         Plan  1. Essential hypertension  Stable, WNL, no medications    2. Arthritis  - diclofenac sodium (VOLTAREN) 1 % GEL; Apply 2-4 g topically 4 times daily as needed for Pain  Dispense: 100 g; Refill: 1    3. Polyneuropathy  - gabapentin (NEURONTIN) 400 MG capsule; TAKE 1 CAPSULE WITH        BREAKFAST AND LUNCH  Dispense: 180 capsule; Refill: 1  - gabapentin (NEURONTIN) 300 MG capsule; TAKE 2 CAPSULES IN THE     EVENING AND 2 CAPSULES     BEFORE BEDTIME  Dispense: 360 capsule; Refill: 1  - gabapentin (NEURONTIN) 100 MG capsule; Take 1 tablet by mouth every morning and at lunch, with the 400 mg tablet  Dispense: 180 capsule; Refill: 1    All scripts accurate and appropriate,refilled 6 mo    4. Mammogram declined  Breast cancer screening modalities discussed in detail. Pt refuses/defers any further mammograms. Risks of this discussed, including progressive cancer, deformity, pain, death. Pt understands risks and wishes to proceed. Declined DEXA scan  All vaccines due refused  Provided information on PNA 23 vaccine       While assessing care for this patient, I have reviewed all pertinent lab work/imaging/ specialist notes and care in reference to those problems addressed above in detail.  Appropriate medical decision making was based on this. Please note that portions of this note may have been completed with a voice recognition program. Efforts were made to edit the dictations but occasionally words are mis-transcribed. Return in about 6 months (around 5/4/2022) for 30 minute visit, follow up medications, follow up labs.

## 2022-01-03 ENCOUNTER — OFFICE VISIT (OUTPATIENT)
Dept: FAMILY MEDICINE CLINIC | Age: 67
End: 2022-01-03
Payer: COMMERCIAL

## 2022-01-03 VITALS
DIASTOLIC BLOOD PRESSURE: 74 MMHG | SYSTOLIC BLOOD PRESSURE: 126 MMHG | HEART RATE: 80 BPM | BODY MASS INDEX: 35.73 KG/M2 | OXYGEN SATURATION: 98 % | WEIGHT: 249 LBS

## 2022-01-03 DIAGNOSIS — N84.1 CERVICAL POLYP: Primary | ICD-10-CM

## 2022-01-03 PROCEDURE — 99213 OFFICE O/P EST LOW 20 MIN: CPT | Performed by: FAMILY MEDICINE

## 2022-01-03 ASSESSMENT — ENCOUNTER SYMPTOMS
BACK PAIN: 0
CONSTIPATION: 0
ABDOMINAL PAIN: 0
VOMITING: 0
DIARRHEA: 0
SHORTNESS OF BREATH: 0
COLOR CHANGE: 0
NAUSEA: 0
ABDOMINAL DISTENTION: 0

## 2022-01-03 NOTE — PROGRESS NOTES
1/3/2022    This is a 77 y.o. female who presents for  Chief Complaint   Patient presents with    3 Month Follow-Up       HPI:      Pelvic exam to follow up on cervical polyp and ulceration  No new vaginal complaints: denies vaginal pain, discharge, bleeding   Not currently sexually active     Past Medical History:   Diagnosis Date    Allergic rhinitis     Arthritis     Dizziness     Hip pain     Hx of blood clots 2012    PE    Hypertension     Does not currently take any medications    IBS (irritable bowel syndrome)     Neuropathy     Pulmonary embolism (HCC)     Recurrent low back pain     Thyroid disease     enlarged    Tinnitus        Past Surgical History:   Procedure Laterality Date    COLONOSCOPY  10/26/2016    Polyps    FOOT SURGERY Left     THYROIDECTOMY Left 8/3/2020    LEFT THYROID LOBECTOMY WITH NERVE MONITORING  CPT CODE - 00663 performed by Edna Coates DO at [facility], PARTIAL Left 08/2020    TONSILLECTOMY         Social History     Socioeconomic History    Marital status:      Spouse name: Not on file    Number of children: 2    Years of education: Not on file    Highest education level: Not on file   Occupational History    Not on file   Tobacco Use    Smoking status: Never Smoker    Smokeless tobacco: Never Used   Vaping Use    Vaping Use: Never used   Substance and Sexual Activity    Alcohol use: No    Drug use: No    Sexual activity: Yes     Partners: Male     Comment: 2 cups /day   Other Topics Concern    Not on file   Social History Narrative    Not on file     Social Determinants of Health     Financial Resource Strain: Low Risk     Difficulty of Paying Living Expenses: Not hard at all   Food Insecurity: No Food Insecurity    Worried About Running Out of Food in the Last Year: Never true    Mary of Food in the Last Year: Never true   Transportation Needs: No Transportation Needs    Lack of Transportation (Medical):  No    Lack of Transportation (Non-Medical):  No   Physical Activity:     Days of Exercise per Week: Not on file    Minutes of Exercise per Session: Not on file   Stress:     Feeling of Stress : Not on file   Social Connections:     Frequency of Communication with Friends and Family: Not on file    Frequency of Social Gatherings with Friends and Family: Not on file    Attends Jain Services: Not on file    Active Member of [address] [place] [address] or Organizations: Not on file    Attends Club or Organization Meetings: Not on file    Marital Status: Not on file   Intimate Partner Violence:     Fear of Current or Ex-Partner: Not on file    Emotionally Abused: Not on file    Physically Abused: Not on file    Sexually Abused: Not on file   Housing Stability:     Unable to Pay for Housing in the Last Year: Not on file    Number of [place] in the Last Year: Not on file    Unstable Housing in the Last Year: Not on file       Family History   Problem Relation Age of Onset    Cancer Father     Heart Disease Sister     Other Sister         Pulmonary Embolism       Current Outpatient Medications   Medication Sig Dispense Refill    diclofenac sodium (VOLTAREN) 1 % GEL Apply 2-4 g topically 4 times daily as needed for Pain 100 g 1    gabapentin (NEURONTIN) 100 MG capsule Take 1 tablet by mouth every morning and at lunch, with the 400 mg tablet 180 capsule 1    gabapentin (NEURONTIN) 400 MG capsule TAKE 1 CAPSULE WITH        BREAKFAST AND LUNCH 180 capsule 1    gabapentin (NEURONTIN) 300 MG capsule TAKE 2 CAPSULES IN THE     EVENING AND 2 CAPSULES     BEFORE BEDTIME 360 capsule 1    aspirin 81 MG chewable tablet Take 81 mg by mouth daily      Glucosamine-Chondroit-Vit C-Mn (GLUCOSAMINE 1500 COMPLEX) CAPS Take by mouth      melatonin 3 MG TABS tablet Take 10 mg by mouth daily Take one half tablet nightly       magnesium (MAGNESIUM-OXIDE) 250 MG TABS tablet Take 250 mg by mouth daily      Calcium Carbonate-Vit D-Min 4532-2412 MG-UNIT CHEW Take 600 mg by mouth daily        No current facility-administered medications for this visit. Immunization History   Administered Date(s) Administered    Hepatitis A Adult (Vaqta) 10/31/2018    Influenza Vaccine, unspecified formulation 10/10/2016    Influenza, Quadv, IM, PF (6 mo and older Fluzone, Flulaval, Fluarix, and 3 yrs and older Afluria) 10/01/2018, 10/11/2019    Tdap (Boostrix, Adacel) 10/20/2011    Zoster Live (Zostavax) 11/29/2013    Zoster Recombinant (Shingrix) 09/13/2019, 12/27/2019       Allergies   Allergen Reactions    Penicillins Hives       Review of Systems   Constitutional: Negative for activity change, chills, fever and unexpected weight change. Respiratory: Negative for shortness of breath. Cardiovascular: Negative for chest pain. Gastrointestinal: Negative for abdominal distention, abdominal pain, constipation, diarrhea, nausea and vomiting. Genitourinary: Negative for decreased urine volume, difficulty urinating, dyspareunia, dysuria, flank pain, frequency, genital sores, hematuria, menstrual problem, pelvic pain, urgency, vaginal bleeding, vaginal discharge and vaginal pain. Musculoskeletal: Negative for back pain. Skin: Negative for color change and rash. Allergic/Immunologic: Negative for immunocompromised state. Hematological: Negative for adenopathy. /74 (Site: Left Upper Arm, Position: Sitting, Cuff Size: Large Adult)   Pulse 80   Wt 249 lb (112.9 kg)   LMP  (LMP Unknown)   SpO2 98%   BMI 35.73 kg/m²     Physical Exam  Vitals and nursing note reviewed. Exam conducted with a chaperone present. Constitutional:       General: She is not in acute distress. Appearance: She is well-developed. She is not diaphoretic. HENT:      Head: Normocephalic and atraumatic. Right Ear: External ear normal.      Left Ear: External ear normal.      Mouth/Throat:      Pharynx: No oropharyngeal exudate.    Eyes:      General: Right eye: No discharge. Left eye: No discharge. Conjunctiva/sclera: Conjunctivae normal.      Pupils: Pupils are equal, round, and reactive to light. Neck:      Thyroid: No thyromegaly. Cardiovascular:      Rate and Rhythm: Normal rate and regular rhythm. Heart sounds: Normal heart sounds. No murmur heard. No friction rub. No gallop. Pulmonary:      Effort: Pulmonary effort is normal. No respiratory distress. Breath sounds: Normal breath sounds. No wheezing or rales. Abdominal:      General: Bowel sounds are normal. There is no distension. Palpations: Abdomen is soft. Tenderness: There is no abdominal tenderness. There is no guarding or rebound. Genitourinary:     Vagina: No signs of injury and foreign body. No vaginal discharge, erythema, tenderness or bleeding. Cervix: Discharge and lesion present. No cervical motion tenderness, friability or erythema. Comments: Blue: 6 mm polyp, unchanged from exam in 10/21, no ulcerations, erythema, color changes, growth   Red: vascular lesion, unchanged  Musculoskeletal:         General: Normal range of motion. Cervical back: Normal range of motion and neck supple. Lymphadenopathy:      Cervical: No cervical adenopathy. Skin:     General: Skin is warm and dry. Coloration: Skin is not pale. Findings: No erythema or rash. Neurological:      Mental Status: She is alert. Cranial Nerves: No cranial nerve deficit. Coordination: Coordination normal.   Psychiatric:         Behavior: Behavior normal.         Thought Content: Thought content normal.         Judgment: Judgment normal.         Plan  1. Cervical polyp  Exam reassuring, unchanged. Monitor with pelvic exam yearly.  Path reviewed from 10/1/21 pap smear, normal with neg HPV        While assessing care for this patient, I have reviewed all pertinent lab work/imaging/ specialist notes and care in reference to those problems addressed above in detail. Appropriate medical decision making was based on this. Please note that portions of this note may have been completed with a voice recognition program. Efforts were made to edit the dictations but occasionally words are mis-transcribed. Return in about 6 months (around 7/3/2022) for 30 minute visit, follow up medications.

## 2022-03-17 DIAGNOSIS — G62.9 POLYNEUROPATHY: ICD-10-CM

## 2022-03-17 NOTE — TELEPHONE ENCOUNTER
----- Message from [facility] sent at 3/17/2022  3:05 PM EDT -----  Subject: Refill Request    QUESTIONS  Name of Medication? gabapentin (NEURONTIN) 100 MG capsule  Patient-reported dosage and instructions? 1 x daily  How many days do you have left? 0  Preferred Pharmacy? [facility] phone number (if available)? [phone]  ---------------------------------------------------------------------------  --------------,  Name of Medication? gabapentin (NEURONTIN) 400 MG capsule  Patient-reported dosage and instructions? 1 x daily  How many days do you have left? 0  Preferred Pharmacy? [facility] phone number (if available)? [phone]  ---------------------------------------------------------------------------  --------------,  Name of Medication? gabapentin (NEURONTIN) 300 MG capsule  Patient-reported dosage and instructions? 1 x daily   How many days do you have left? 0  Preferred Pharmacy? [facility] phone number (if available)? [phone]  Additional Information for Provider? Keturah Valentine with [email or web address]  ---------------------------------------------------------------------------  --------------  CALL BACK INFO  What is the best way for the office to contact you? Do not leave any   message, patient will call back for answer  Preferred Call Back Phone Number?  [phone]

## 2022-03-17 NOTE — TELEPHONE ENCOUNTER
Refill Request     Last Seen: Last Seen Department: 1/3/2022  Last Seen by PCP: 1/3/2022    Last Written: 11/4/2021 Gabapentin 100MG #180 with 1 refill  11/4/2021 Gabapentin 400MG #180 with 1 refill  11/4/2021 Gabapentin 300 MG #360 with 1 refill    Next Appointment:   Future Appointments   Date Time Provider Betzy Patel   5/2/2022 11:15 AM MD VIPIN Hernadez  [place] - DYBENJA   6/1/2022 11:00 AM MD VIPIN Hernadez  [place] - [place]IC[place]   12/28/2022 10:00 AM MD VIPIN Hernadez [facility] - DYB[facility]JA       Future appointment scheduled      Requested Prescriptions     Pending Prescriptions Disp Refills    gabapentin (NEURONTIN) 100 MG capsule 180 capsule 1     Sig: Take 1 tablet by mouth every morning and at lunch, with the 400 mg tablet    gabapentin (NEURONTIN) 400 MG capsule 180 capsule 1     Sig: TAKE 1 CAPSULE WITH        BREAKFAST AND LUNCH    gabapentin (NEURONTIN) 300 MG capsule 360 capsule 1     Sig: TAKE 2 CAPSULES IN THE     EVENING AND 2 CAPSULES     BEFORE BEDTIME

## 2022-03-18 RX ORDER — GABAPENTIN 400 MG/1
CAPSULE ORAL
Qty: 180 CAPSULE | Refills: 1 | Status: SHIPPED | OUTPATIENT
Start: 2022-03-18 | End: 2022-09-07

## 2022-03-18 RX ORDER — GABAPENTIN 100 MG/1
CAPSULE ORAL
Qty: 180 CAPSULE | Refills: 1 | Status: SHIPPED | OUTPATIENT
Start: 2022-03-18 | End: 2022-09-07

## 2022-03-18 RX ORDER — GABAPENTIN 300 MG/1
CAPSULE ORAL
Qty: 360 CAPSULE | Refills: 1 | Status: SHIPPED | OUTPATIENT
Start: 2022-03-18 | End: 2022-09-07

## 2022-05-02 ENCOUNTER — OFFICE VISIT (OUTPATIENT)
Dept: FAMILY MEDICINE CLINIC | Age: 67
End: 2022-05-02
Payer: COMMERCIAL

## 2022-05-02 VITALS
DIASTOLIC BLOOD PRESSURE: 80 MMHG | HEART RATE: 86 BPM | BODY MASS INDEX: 37.16 KG/M2 | OXYGEN SATURATION: 98 % | SYSTOLIC BLOOD PRESSURE: 130 MMHG | WEIGHT: 259 LBS

## 2022-05-02 DIAGNOSIS — Z00.00 ANNUAL PHYSICAL EXAM: ICD-10-CM

## 2022-05-02 DIAGNOSIS — E78.2 MIXED HYPERLIPIDEMIA: ICD-10-CM

## 2022-05-02 DIAGNOSIS — E55.9 VITAMIN D DEFICIENCY: ICD-10-CM

## 2022-05-02 DIAGNOSIS — I10 ESSENTIAL HYPERTENSION: ICD-10-CM

## 2022-05-02 DIAGNOSIS — R79.9 ABNORMAL FINDING OF BLOOD CHEMISTRY, UNSPECIFIED: ICD-10-CM

## 2022-05-02 DIAGNOSIS — G62.9 POLYNEUROPATHY: Primary | ICD-10-CM

## 2022-05-02 DIAGNOSIS — M19.90 ARTHRITIS: ICD-10-CM

## 2022-05-02 DIAGNOSIS — I26.99 PULMONARY EMBOLISM, OTHER, UNSPECIFIED CHRONICITY, UNSPECIFIED WHETHER ACUTE COR PULMONALE PRESENT (HCC): ICD-10-CM

## 2022-05-02 DIAGNOSIS — E04.9 THYROID GOITER: ICD-10-CM

## 2022-05-02 PROCEDURE — 99214 OFFICE O/P EST MOD 30 MIN: CPT | Performed by: FAMILY MEDICINE

## 2022-05-02 RX ORDER — ACETAMINOPHEN 160 MG
2000 TABLET,DISINTEGRATING ORAL WEEKLY
COMMUNITY

## 2022-05-02 ASSESSMENT — ENCOUNTER SYMPTOMS
ABDOMINAL PAIN: 0
VOMITING: 0
COLOR CHANGE: 0
NAUSEA: 0
CHEST TIGHTNESS: 0
SHORTNESS OF BREATH: 0

## 2022-05-02 NOTE — PROGRESS NOTES
5/2/2022    This is a 77 y.o. female who presents for  Chief Complaint   Patient presents with    6 Month Follow-Up       HPI:    6 mo f/u    Harder to move but no neuropathic pain   [facility]   Considering [facility] in the fall   Doing well on Gabapentin changes/increases  No AEs  Requests refills to the mail in pharmacy    HTN  No medications  Monitors at home: 120/70s  No acute concerning Sxs: No CP, SOB, palpitations, dizziness, HA, etc.   Tries to stay relaxed when coming in here for readings     Defers any vaccines  Declines mammogram      Past Medical History:   Diagnosis Date    Allergic rhinitis     Arthritis     Dizziness     Hip pain     Hx of blood clots 2012    PE    Hypertension     Does not currently take any medications    IBS (irritable bowel syndrome)     Neuropathy     Pulmonary embolism (HCC)     Recurrent low back pain     Thyroid disease     enlarged    Tinnitus        Past Surgical History:   Procedure Laterality Date    COLONOSCOPY  10/26/2016    Polyps    FOOT SURGERY Left     THYROIDECTOMY Left 8/3/2020    LEFT THYROID LOBECTOMY WITH NERVE MONITORING  CPT CODE - 97742 performed by Joe Nassar DO at [address] [place] [address], PARTIAL Left 08/2020    TONSILLECTOMY         Social History     Socioeconomic History    Marital status:      Spouse name: Not on file    Number of children: 2    Years of education: Not on file    Highest education level: Not on file   Occupational History    Not on file   Tobacco Use    Smoking status: Never Smoker    Smokeless tobacco: Never Used   Vaping Use    Vaping Use: Never used   Substance and Sexual Activity    Alcohol use: No    Drug use: No    Sexual activity: Yes     Partners: Male     Comment: 2 cups /day   Other Topics Concern    Not on file   Social History Narrative    Not on file     Social Determinants of Health     Financial Resource Strain: Low Risk     Difficulty of Paying Living Expenses: Not hard at all   Food Insecurity: No Food Insecurity    Worried About [facility] in the Last Year: Never true    Mary of Food in the Last Year: Never true   Transportation Needs: No Transportation Needs    Lack of Transportation (Medical): No    Lack of Transportation (Non-Medical):  No   Physical Activity:     Days of Exercise per Week: Not on file    Minutes of Exercise per Session: Not on file   Stress:     Feeling of Stress : Not on file   Social Connections:     Frequency of Communication with Friends and Family: Not on file    Frequency of Social Gatherings with Friends and Family: Not on file    Attends Catholic Services: Not on file    Active Member of [address] [place] [address] or Organizations: Not on file    Attends Club or Organization Meetings: Not on file    Marital Status: Not on file   Intimate Partner Violence:     Fear of Current or Ex-Partner: Not on file    Emotionally Abused: Not on file    Physically Abused: Not on file    Sexually Abused: Not on file   Housing Stability:     Unable to Pay for Housing in the Last Year: Not on file    Number of [place] in the Last Year: Not on file    Unstable Housing in the Last Year: Not on file       Family History   Problem Relation Age of Onset    Cancer Father     Heart Disease Sister     Other Sister         Pulmonary Embolism       Current Outpatient Medications   Medication Sig Dispense Refill    Cholecalciferol (VITAMIN D3) 50 MCG (2000 UT) CAPS Take 2,000 Units by mouth once a week 2,000 units a week      gabapentin (NEURONTIN) 100 MG capsule Take 1 tablet by mouth every morning and at lunch, with the 400 mg tablet 180 capsule 1    gabapentin (NEURONTIN) 400 MG capsule TAKE 1 CAPSULE WITH        BREAKFAST AND LUNCH 180 capsule 1    gabapentin (NEURONTIN) 300 MG capsule TAKE 2 CAPSULES IN THE     EVENING AND 2 CAPSULES     BEFORE BEDTIME 360 capsule 1    diclofenac sodium (VOLTAREN) 1 % GEL Apply 2-4 g topically 4 times daily as needed for Pain 100 g 1    aspirin 81 MG chewable tablet Take 81 mg by mouth daily      Glucosamine-Chondroit-Vit C-Mn (GLUCOSAMINE 1500 COMPLEX) CAPS Take by mouth      melatonin 3 MG TABS tablet Take 10 mg by mouth daily Take one half tablet nightly       magnesium (MAGNESIUM-OXIDE) 250 MG TABS tablet Take 250 mg by mouth daily      Calcium Carbonate-Vit D-Min 4192-6405 MG-UNIT CHEW Take 600 mg by mouth daily        No current facility-administered medications for this visit. Immunization History   Administered Date(s) Administered    Hepatitis A Adult (Vaqta) 10/31/2018    Influenza Vaccine, unspecified formulation 10/10/2016    Influenza, Quadv, IM, PF (6 mo and older Fluzone, Flulaval, Fluarix, and 3 yrs and older Afluria) 10/01/2018, 10/11/2019    Tdap (Boostrix, Adacel) 10/20/2011    Zoster Live (Zostavax) 11/29/2013    Zoster Recombinant (Shingrix) 09/13/2019, 12/27/2019       Allergies   Allergen Reactions    Penicillins Hives       Review of Systems   Constitutional: Negative for activity change, appetite change, chills, diaphoresis, fatigue and fever. Eyes: Negative for visual disturbance. Respiratory: Negative for chest tightness and shortness of breath. Cardiovascular: Negative for chest pain, palpitations and leg swelling. Gastrointestinal: Negative for abdominal pain, nausea and vomiting. Genitourinary: Negative for decreased urine volume. Skin: Negative for color change. Neurological: Negative for dizziness, weakness, light-headedness, numbness and headaches. /80 (Site: Left Upper Arm, Position: Sitting, Cuff Size: Large Adult)   Pulse 86   Wt 259 lb (117.5 kg)   LMP  (LMP Unknown)   SpO2 98%   BMI 37.16 kg/m²     Physical Exam  Vitals and nursing note reviewed. Constitutional:       General: She is not in acute distress. Appearance: She is well-developed. She is not diaphoretic. HENT:      Head: Normocephalic and atraumatic.    Eyes: Conjunctiva/sclera: Conjunctivae normal.      Pupils: Pupils are equal, round, and reactive to light. Neck:      Vascular: No JVD. Cardiovascular:      Rate and Rhythm: Normal rate and regular rhythm. Heart sounds: Normal heart sounds. No murmur heard. No friction rub. No gallop. Pulmonary:      Effort: Pulmonary effort is normal. No respiratory distress. Breath sounds: Normal breath sounds. No wheezing or rales. Chest:      Chest wall: No tenderness. Abdominal:      Palpations: Abdomen is soft. Tenderness: There is no abdominal tenderness. Musculoskeletal:         General: Normal range of motion. Cervical back: Normal range of motion and neck supple. Skin:     General: Skin is warm and dry. Capillary Refill: Capillary refill takes 2 to 3 seconds. Findings: No erythema. Neurological:      Mental Status: She is alert and oriented to person, place, and time. Psychiatric:         Behavior: Behavior normal.         Thought Content: Thought content normal.         Judgment: Judgment normal.         Plan  1. Polyneuropathy  Stable on Gabapentin, ok for script Q 6 mo  - Comprehensive Metabolic Panel; Future  - CBC with Auto Differential; Future  - Hemoglobin A1C; Future  - Vitamin D 25 Hydroxy; Future  - TSH with Reflex; Future    2. Mixed hyperlipidemia  The 10-year ASCVD risk score (Josiane Dougherty, et al., 2013) is: 6.3%    Values used to calculate the score:      Age: 77 years      Sex: Female      Is Non- : No      Diabetic: No      Tobacco smoker: No      Systolic Blood Pressure: 183 mmHg      Is BP treated: No      HDL Cholesterol: 60 mg/dL      Total Cholesterol: 206 mg/dL  - Lipid Panel; Future    3. Essential hypertension  No medications  - Comprehensive Metabolic Panel; Future  - CBC with Auto Differential; Future  - TSH with Reflex; Future    4. Annual physical exam  Labs due in 11/22  - Comprehensive Metabolic Panel;  Future  - CBC with Auto Differential; Future  - Hemoglobin A1C; Future  - Lipid Panel; Future  - Vitamin D 25 Hydroxy; Future  - TSH with Reflex; Future    5. Pulmonary embolism, other, unspecified chronicity, unspecified whether acute cor pulmonale present ([facility] [address]  No new concerns   - CBC with Auto Differential; Future    6. Thyroid goiter  - Comprehensive Metabolic Panel; Future  - TSH with Reflex; Future    7. Arthritis  - Comprehensive Metabolic Panel; Future  - Vitamin D 25 Hydroxy; Future  - TSH with Reflex; Future    8. Vitamin D deficiency  - Vitamin D 25 Hydroxy; Future    9. Abnormal finding of blood chemistry, unspecified  - Hemoglobin A1C; Future      AWV in 11/22 with her  with Uzma Mahan     While assessing care for this patient, I have reviewed all pertinent lab work/imaging/ specialist notes and care in reference to those problems addressed above in detail. Appropriate medical decision making was based on this. Please note that portions of this note may have been completed with a voice recognition program. Efforts were made to edit the dictations but occasionally words are mis-transcribed. Return in about 27 weeks (around 11/7/2022) for AWV with Elaine/ Lab work, and then 6 mo after with me in office .

## 2022-05-25 SDOH — HEALTH STABILITY: PHYSICAL HEALTH: ON AVERAGE, HOW MANY DAYS PER WEEK DO YOU ENGAGE IN MODERATE TO STRENUOUS EXERCISE (LIKE A BRISK WALK)?: 6 DAYS

## 2022-05-25 SDOH — HEALTH STABILITY: PHYSICAL HEALTH: ON AVERAGE, HOW MANY MINUTES DO YOU ENGAGE IN EXERCISE AT THIS LEVEL?: 30 MIN

## 2022-05-25 ASSESSMENT — PATIENT HEALTH QUESTIONNAIRE - PHQ9
2. FEELING DOWN, DEPRESSED OR HOPELESS: 0
1. LITTLE INTEREST OR PLEASURE IN DOING THINGS: 0
SUM OF ALL RESPONSES TO PHQ QUESTIONS 1-9: 0
SUM OF ALL RESPONSES TO PHQ9 QUESTIONS 1 & 2: 0

## 2022-05-25 ASSESSMENT — LIFESTYLE VARIABLES: HOW OFTEN DO YOU HAVE A DRINK CONTAINING ALCOHOL: NEVER

## 2022-06-02 SDOH — HEALTH STABILITY: PHYSICAL HEALTH: ON AVERAGE, HOW MANY DAYS PER WEEK DO YOU ENGAGE IN MODERATE TO STRENUOUS EXERCISE (LIKE A BRISK WALK)?: 6 DAYS

## 2022-06-02 SDOH — HEALTH STABILITY: PHYSICAL HEALTH: ON AVERAGE, HOW MANY MINUTES DO YOU ENGAGE IN EXERCISE AT THIS LEVEL?: 30 MIN

## 2022-06-02 ASSESSMENT — PATIENT HEALTH QUESTIONNAIRE - PHQ9
SUM OF ALL RESPONSES TO PHQ QUESTIONS 1-9: 0
SUM OF ALL RESPONSES TO PHQ QUESTIONS 1-9: 0
1. LITTLE INTEREST OR PLEASURE IN DOING THINGS: 0
SUM OF ALL RESPONSES TO PHQ QUESTIONS 1-9: 0
2. FEELING DOWN, DEPRESSED OR HOPELESS: 0
SUM OF ALL RESPONSES TO PHQ QUESTIONS 1-9: 0
SUM OF ALL RESPONSES TO PHQ9 QUESTIONS 1 & 2: 0

## 2022-06-02 ASSESSMENT — LIFESTYLE VARIABLES
HOW OFTEN DO YOU HAVE A DRINK CONTAINING ALCOHOL: 1
HOW OFTEN DO YOU HAVE SIX OR MORE DRINKS ON ONE OCCASION: 1
HOW OFTEN DO YOU HAVE A DRINK CONTAINING ALCOHOL: NEVER

## 2022-06-09 ENCOUNTER — OFFICE VISIT (OUTPATIENT)
Dept: FAMILY MEDICINE CLINIC | Age: 67
End: 2022-06-09
Payer: COMMERCIAL

## 2022-06-09 VITALS
BODY MASS INDEX: 35.5 KG/M2 | HEART RATE: 78 BPM | DIASTOLIC BLOOD PRESSURE: 88 MMHG | WEIGHT: 248 LBS | HEIGHT: 70 IN | RESPIRATION RATE: 16 BRPM | SYSTOLIC BLOOD PRESSURE: 142 MMHG | OXYGEN SATURATION: 98 %

## 2022-06-09 DIAGNOSIS — Z00.00 MEDICARE ANNUAL WELLNESS VISIT, SUBSEQUENT: Primary | ICD-10-CM

## 2022-06-09 PROCEDURE — G0439 PPPS, SUBSEQ VISIT: HCPCS | Performed by: FAMILY MEDICINE

## 2022-06-09 PROCEDURE — 1123F ACP DISCUSS/DSCN MKR DOCD: CPT | Performed by: FAMILY MEDICINE

## 2022-06-09 NOTE — PROGRESS NOTES
Medicare Annual Wellness Visit    Ambrosio Maria is here for Medicare AWV    Assessment & Plan   Medicare annual wellness visit, subsequent      Recommendations for Preventive Services Due: see orders and patient instructions/AVS.  Recommended screening schedule for the next 5-10 years is provided to the patient in written form: see Patient Instructions/AVS.     No follow-ups on file. Subjective       Patient's complete Health Risk Assessment and screening values have been reviewed and are found in Flowsheets. The following problems were reviewed today and where indicated follow up appointments were made and/or referrals ordered. Positive Risk Factor Screenings with Interventions:             General Health and ACP:  General  In general, how would you say your health is?: Good  In the past 7 days, have you experienced any of the following: New or Increased Pain, New or Increased Fatigue, Loneliness, Social Isolation, Stress or Anger?: No  Do you get the social and emotional support that you need?: Yes  Do you have a Living Will?: (!) No    Advance Directives     Power of  Living Will ACP-Advance Directive ACP-Power of     Not on File Not on File Not on File Not on File      General Health Risk Interventions:  · No Living Will: Patient declines ACP discussion/assistance    Health Habits/Nutrition:     Physical Activity: Sufficiently Active    Days of Exercise per Week: 6 days    Minutes of Exercise per Session: 30 min     Have you lost any weight without trying in the past 3 months?: No  Body mass index: (!) 35.58  Have you seen the dentist within the past year?: Yes    Health Habits/Nutrition Interventions:  · Nutritional issues:  educational materials for healthy, well-balanced diet provided             Objective   Vitals:    06/09/22 1005   BP: (!) 142/88   Pulse: 78   Resp: 16   SpO2: 98%   Weight: 248 lb (112.5 kg)   Height: 5' 10\" (1.778 m)      Body mass index is 35.58 kg/m². Allergies   Allergen Reactions    Penicillins Hives     Prior to Visit Medications    Medication Sig Taking?  Authorizing Provider   Cholecalciferol (VITAMIN D3) 50 MCG (2000 UT) CAPS Take 2,000 Units by mouth once a week 2,000 units a week Yes Historical Provider, MD   gabapentin (NEURONTIN) 400 MG capsule TAKE 1 CAPSULE WITH        BREAKFAST AND LUNCH Yes Maria Tam MD   gabapentin (NEURONTIN) 300 MG capsule TAKE 2 CAPSULES IN THE     EVENING AND 2 CAPSULES     BEFORE BEDTIME Yes Maria Tam MD   diclofenac sodium (VOLTAREN) 1 % GEL Apply 2-4 g topically 4 times daily as needed for Pain Yes Maria Tam MD   aspirin 81 MG chewable tablet Take 81 mg by mouth daily Yes Historical Provider, MD   Glucosamine-Chondroit-Vit C-Mn (GLUCOSAMINE 1500 COMPLEX) CAPS Take by mouth Yes Historical Provider, MD   melatonin 3 MG TABS tablet Take 10 mg by mouth daily Take one half tablet nightly  Yes Historical Provider, MD   magnesium (MAGNESIUM-OXIDE) 250 MG TABS tablet Take 250 mg by mouth daily Yes Historical Provider, MD   Calcium Carbonate-Vit D-Min 0311-2788 MG-UNIT CHEW Take 600 mg by mouth daily  Yes Historical Provider, MD   gabapentin (NEURONTIN) 100 MG capsule Take 1 tablet by mouth every morning and at lunch, with the 400 mg tablet  Maria Tam MD       [facility] (Including outside providers/suppliers regularly involved in providing care):   Patient Care Team:  Maria Tam MD as PCP - General (Family Medicine)  Maria Tam MD as PCP - [facility] Empaneled Provider  Walt Modi DO as Consulting Physician (Orthopedic Surgery)  Monica Gu MD as Consulting Physician (Physical Medicine and Rehab)  Kar Durant MD (Endocrinology)     Reviewed and updated this visit:  Tobacco  Allergies  Meds  Med Hx  Surg Hx  Soc Hx  Fam Hx              I, Madelyn Chen LPN, 3/1/7365, performed the documented evaluation under the direct supervision of the attending physician. This encounter was performed under Radha mir MDs, direct supervision, 6/9/2022.

## 2022-06-09 NOTE — PATIENT INSTRUCTIONS
Personalized Preventive Plan for Susannah Samaniego - 6/9/2022  Medicare offers a range of preventive health benefits. Some of the tests and screenings are paid in full while other may be subject to a deductible, co-insurance, and/or copay. Some of these benefits include a comprehensive review of your medical history including lifestyle, illnesses that may run in your family, and various assessments and screenings as appropriate. After reviewing your medical record and screening and assessments performed today your provider may have ordered immunizations, labs, imaging, and/or referrals for you. A list of these orders (if applicable) as well as your Preventive Care list are included within your After Visit Summary for your review. Other Preventive Recommendations:    · A preventive eye exam performed by an eye specialist is recommended every 1-2 years to screen for glaucoma; cataracts, macular degeneration, and other eye disorders. · A preventive dental visit is recommended every 6 months. · Try to get at least 150 minutes of exercise per week or 10,000 steps per day on a pedometer . · Order or download the FREE \"Exercise & Physical Activity: Your Everyday Guide\" from [employer]. Call 8-231.361.6640 or search [employer] online. · You need 3406-3927 mg of calcium and 5576-2502 IU of vitamin D per day. It is possible to meet your calcium requirement with diet alone, but a vitamin D supplement is usually necessary to meet this goal.  · When exposed to the sun, use a sunscreen that protects against both UVA and UVB radiation with an SPF of 30 or greater. Reapply every 2 to 3 hours or after sweating, drying off with a towel, or swimming. · Always wear a seat belt when traveling in a car. Always wear a helmet when riding a bicycle or motorcycle.   Patient Education        Eating Healthy Foods: Care Instructions  Your Care Instructions     Eating healthy foods can help lower your risk for disease. Healthy food gives you energy and keeps your heart strong, your brain active, your musclesworking, and your bones strong. A healthy diet includes a variety of foods from the basic food groups: grains, vegetables, fruits, milk and milk products, and meat and beans. Some people may eat more of their favorite foods from only one food group and, as a result, miss getting the nutrients they need. So, it is important to pay attention not only to what you eat but also to what you are missing from your diet. You caneat a healthy, balanced diet by making a few small changes. Follow-up care is a key part of your treatment and safety. Be sure to make and go to all appointments, and call your doctor if you are having problems. It's also a good idea to know your test results and keep alist of the medicines you take. How can you care for yourself at home? Look at what you eat  Keep a food diary for a week or two and record everything you eat or drink. Track the number of servings you eat from each food group. For a balanced diet every day, eat a variety of:  6 or more ounce-equivalents of grains, such as cereals, breads, crackers, rice, or pasta, every day. An ounce-equivalent is 1 slice of bread, 1 cup of ready-to-eat cereal, or ½ cup of cooked rice, cooked pasta, or cooked cereal.  2½ cups of vegetables, especially:  Dark-green vegetables such as broccoli and spinach. Orange vegetables such as carrots and sweet potatoes. Dry beans (such as vargas and kidney beans) and peas (such as lentils). 2 cups of fresh, frozen, or canned fruit. A small apple or 1 banana or orange equals 1 cup.  3 cups of nonfat or low-fat milk, yogurt, or other milk products. 5½ ounces of meat and beans, such as chicken, fish, lean meat, beans, nuts, and seeds. One egg, 1 tablespoon of peanut butter, ½ ounce nuts or seeds, or ¼ cup of cooked beans equals 1 ounce of meat.   Learn how to read food labels for serving sizes and ingredients. Fast-food and convenience-food meals often contain few or no fruits or vegetables. Make sure you eat some fruits and vegetables to make the meal more nutritious. Look at your food diary. For each food group, add up what you have eaten and then divide the total by the number of days. This will give you an idea of how much you are eating from each food group. See if you can find some ways to change your diet to make it more healthy. Start small  Do not try to make dramatic changes to your diet all at once. You might feel that you are missing out on your favorite foods and then be more likely to fail. Start slowly, and gradually change your habits. Try some of the following:  Use whole wheat bread instead of white bread. Use nonfat or low-fat milk instead of whole milk. Eat brown rice instead of white rice, and eat whole wheat pasta instead of white-flour pasta. Try low-fat cheeses and low-fat yogurt. Add more fruits and vegetables to meals and have them for snacks. Add lettuce, tomato, cucumber, and onion to sandwiches. Add fruit to yogurt and cereal.  Enjoy food  You can still eat your favorite foods. You just may need to eat less of them. If your favorite foods are high in fat, salt, and sugar, limit how often you eat them, but do not cut them out entirely. Eat a wide variety of foods. Make healthy choices when eating out  The type of restaurant you choose can help you make healthy choices. Even fast-food chains are now offering more low-fat or healthier choices on the menu. Choose smaller portions, or take half of your meal home. When eating out, try:  A veggie pizza with a whole wheat crust or grilled chicken (instead of sausage or pepperoni). Pasta with roasted vegetables, grilled chicken, or marinara sauce instead of cream sauce. A vegetable wrap or grilled chicken wrap. Broiled or poached food instead of fried or breaded items.   Make healthy choices easy  Buy packaged, prewashed, ready-to-eat fresh vegetables and fruits, such as baby carrots, salad mixes, and chopped or shredded broccoli and cauliflower. Buy packaged, presliced fruits, such as melon or pineapple. Choose 100% fruit or vegetable juice instead of soda. Limit juice intake to 4 to 6 oz (½ to ¾ cup) a day. Blend low-fat yogurt, fruit juice, and canned or frozen fruit to make a smoothie for breakfast or a snack. Where can you learn more? Go to https://[employer]. org and sign in to your [employer] account. Enter Z749 in the [facility] box to learn more about \"Eating Healthy Foods: Care Instructions. \"     If you do not have an account, please click on the \"Sign Up Now\" link. Current as of: September 8, 2021               Content Version: 13.2  © 2006-2022 [facility], [facility]. Care instructions adapted under license by [facility] ([facility]). If you have questions about a medical condition or this instruction, always ask your healthcare professional. [place] [address] any warranty or liability for your use of this information.

## 2022-07-15 DIAGNOSIS — Z00.00 ANNUAL PHYSICAL EXAM: ICD-10-CM

## 2022-07-15 DIAGNOSIS — E55.9 VITAMIN D DEFICIENCY: ICD-10-CM

## 2022-07-15 DIAGNOSIS — G62.9 POLYNEUROPATHY: ICD-10-CM

## 2022-07-15 DIAGNOSIS — E78.2 MIXED HYPERLIPIDEMIA: ICD-10-CM

## 2022-07-15 DIAGNOSIS — E04.9 THYROID GOITER: ICD-10-CM

## 2022-07-15 DIAGNOSIS — I26.99 PULMONARY EMBOLISM, OTHER, UNSPECIFIED CHRONICITY, UNSPECIFIED WHETHER ACUTE COR PULMONALE PRESENT (HCC): ICD-10-CM

## 2022-07-15 DIAGNOSIS — R79.9 ABNORMAL FINDING OF BLOOD CHEMISTRY, UNSPECIFIED: ICD-10-CM

## 2022-07-15 DIAGNOSIS — I10 ESSENTIAL HYPERTENSION: ICD-10-CM

## 2022-07-15 DIAGNOSIS — M19.90 ARTHRITIS: ICD-10-CM

## 2022-07-15 LAB
A/G RATIO: 1.7 (ref 1.1–2.2)
ALBUMIN SERPL-MCNC: 4.4 G/DL (ref 3.4–5)
ALP BLD-CCNC: 83 U/L (ref 40–129)
ALT SERPL-CCNC: 16 U/L (ref 10–40)
ANION GAP SERPL CALCULATED.3IONS-SCNC: 11 MMOL/L (ref 3–16)
AST SERPL-CCNC: 15 U/L (ref 15–37)
BASOPHILS ABSOLUTE: 0 K/UL (ref 0–0.2)
BASOPHILS RELATIVE PERCENT: 0.8 %
BILIRUB SERPL-MCNC: 0.4 MG/DL (ref 0–1)
BUN BLDV-MCNC: 14 MG/DL (ref 7–20)
CALCIUM SERPL-MCNC: 9.1 MG/DL (ref 8.3–10.6)
CHLORIDE BLD-SCNC: 106 MMOL/L (ref 99–110)
CHOLESTEROL, TOTAL: 177 MG/DL (ref 0–199)
CO2: 26 MMOL/L (ref 21–32)
CREAT SERPL-MCNC: 0.6 MG/DL (ref 0.6–1.2)
EOSINOPHILS ABSOLUTE: 0.1 K/UL (ref 0–0.6)
EOSINOPHILS RELATIVE PERCENT: 1.8 %
GFR AFRICAN AMERICAN: >60
GFR NON-AFRICAN AMERICAN: >60
GLUCOSE BLD-MCNC: 100 MG/DL (ref 70–99)
HCT VFR BLD CALC: 42.3 % (ref 36–48)
HDLC SERPL-MCNC: 57 MG/DL (ref 40–60)
HEMOGLOBIN: 13.9 G/DL (ref 12–16)
LDL CHOLESTEROL CALCULATED: 105 MG/DL
LYMPHOCYTES ABSOLUTE: 1.1 K/UL (ref 1–5.1)
LYMPHOCYTES RELATIVE PERCENT: 27.2 %
MCH RBC QN AUTO: 29.9 PG (ref 26–34)
MCHC RBC AUTO-ENTMCNC: 32.9 G/DL (ref 31–36)
MCV RBC AUTO: 90.9 FL (ref 80–100)
MONOCYTES ABSOLUTE: 0.3 K/UL (ref 0–1.3)
MONOCYTES RELATIVE PERCENT: 6.1 %
NEUTROPHILS ABSOLUTE: 2.6 K/UL (ref 1.7–7.7)
NEUTROPHILS RELATIVE PERCENT: 64.1 %
PDW BLD-RTO: 14.4 % (ref 12.4–15.4)
PLATELET # BLD: 196 K/UL (ref 135–450)
PMV BLD AUTO: 8.9 FL (ref 5–10.5)
POTASSIUM SERPL-SCNC: 4.8 MMOL/L (ref 3.5–5.1)
RBC # BLD: 4.66 M/UL (ref 4–5.2)
SODIUM BLD-SCNC: 143 MMOL/L (ref 136–145)
TOTAL PROTEIN: 7 G/DL (ref 6.4–8.2)
TRIGL SERPL-MCNC: 76 MG/DL (ref 0–150)
TSH REFLEX: 1.94 UIU/ML (ref 0.27–4.2)
VITAMIN D 25-HYDROXY: 37.1 NG/ML
VLDLC SERPL CALC-MCNC: 15 MG/DL
WBC # BLD: 4.1 K/UL (ref 4–11)

## 2022-07-16 LAB
ESTIMATED AVERAGE GLUCOSE: 114 MG/DL
HBA1C MFR BLD: 5.6 %

## 2022-09-07 DIAGNOSIS — G62.9 POLYNEUROPATHY: ICD-10-CM

## 2022-09-07 RX ORDER — GABAPENTIN 100 MG/1
CAPSULE ORAL
Qty: 180 CAPSULE | Refills: 1 | Status: SHIPPED | OUTPATIENT
Start: 2022-09-07 | End: 2022-11-07

## 2022-09-07 RX ORDER — GABAPENTIN 400 MG/1
CAPSULE ORAL
Qty: 180 CAPSULE | Refills: 1 | Status: SHIPPED | OUTPATIENT
Start: 2022-09-07 | End: 2022-11-07

## 2022-09-07 RX ORDER — GABAPENTIN 300 MG/1
CAPSULE ORAL
Qty: 360 CAPSULE | Refills: 1 | Status: SHIPPED | OUTPATIENT
Start: 2022-09-07 | End: 2022-11-07

## 2022-09-07 NOTE — TELEPHONE ENCOUNTER
Refill Request     CONFIRM preferrred pharmacy with the patient. If Mail Order Rx - Pend for 90 day refill.       Last Seen: Last Seen Department: 6/9/2022  Last Seen by PCP: 6/9/2022    Last Written: 3/18/22 with 1 refill     Next Appointment:   Future Appointments   Date Time Provider Betzy Patel   10/31/2022 11:15 AM MD VIPIN Das  [place] - DYBENJA   12/28/2022 10:00 AM MD VIPIN Das  [place] - [facility]URIC[place]   5/3/2023 10:00 AM MD VIPIN Das  [place] - DYBENJA       Future appointment scheduled      Requested Prescriptions     Pending Prescriptions Disp Refills    gabapentin (NEURONTIN) 400 MG capsule [Pharmacy Med Name: GABAPENTIN CAP 400MG] 180 capsule 1     Sig: TAKE 1 CAPSULE WITH        BREAKFAST AND LUNCH    gabapentin (NEURONTIN) 300 MG capsule [Pharmacy Med Name: GABAPENTIN CAP 300MG] 360 capsule 1     Sig: TAKE 2 CAPSULES IN THE     EVENING AND 2 CAPSULES     BEFORE BEDTIME    gabapentin (NEURONTIN) 100 MG capsule [Pharmacy Med Name: GABAPENTIN CAP 100MG] 180 capsule 1     Sig: TAKE 1 CAPSULE EVERY       MORNING AND AT LUNCH, WITH  MG CAPSULE

## 2022-10-31 ENCOUNTER — OFFICE VISIT (OUTPATIENT)
Dept: FAMILY MEDICINE CLINIC | Age: 67
End: 2022-10-31
Payer: COMMERCIAL

## 2022-10-31 VITALS
TEMPERATURE: 98.2 F | BODY MASS INDEX: 34.36 KG/M2 | HEIGHT: 70 IN | WEIGHT: 240 LBS | SYSTOLIC BLOOD PRESSURE: 124 MMHG | DIASTOLIC BLOOD PRESSURE: 70 MMHG | HEART RATE: 76 BPM | OXYGEN SATURATION: 98 %

## 2022-10-31 DIAGNOSIS — E78.2 MIXED HYPERLIPIDEMIA: ICD-10-CM

## 2022-10-31 DIAGNOSIS — I10 ESSENTIAL HYPERTENSION: Primary | ICD-10-CM

## 2022-10-31 DIAGNOSIS — Z53.20 SCREENING MAMMOGRAPHY DECLINED: ICD-10-CM

## 2022-10-31 DIAGNOSIS — M15.9 GENERALIZED OSTEOARTHRITIS: ICD-10-CM

## 2022-10-31 PROBLEM — M19.90 ARTHRITIS: Status: RESOLVED | Noted: 2021-05-27 | Resolved: 2022-10-31

## 2022-10-31 PROCEDURE — 3074F SYST BP LT 130 MM HG: CPT | Performed by: FAMILY MEDICINE

## 2022-10-31 PROCEDURE — 99214 OFFICE O/P EST MOD 30 MIN: CPT | Performed by: FAMILY MEDICINE

## 2022-10-31 PROCEDURE — 3078F DIAST BP <80 MM HG: CPT | Performed by: FAMILY MEDICINE

## 2022-10-31 PROCEDURE — 1123F ACP DISCUSS/DSCN MKR DOCD: CPT | Performed by: FAMILY MEDICINE

## 2022-10-31 SDOH — ECONOMIC STABILITY: HOUSING INSECURITY
IN THE LAST 12 MONTHS, WAS THERE A TIME WHEN YOU DID NOT HAVE A STEADY PLACE TO SLEEP OR SLEPT IN A SHELTER (INCLUDING NOW)?: NO

## 2022-10-31 SDOH — ECONOMIC STABILITY: HOUSING INSECURITY: IN THE LAST 12 MONTHS, HOW MANY PLACES HAVE YOU LIVED?: 1

## 2022-10-31 SDOH — ECONOMIC STABILITY: FOOD INSECURITY: WITHIN THE PAST 12 MONTHS, YOU WORRIED THAT YOUR FOOD WOULD RUN OUT BEFORE YOU GOT MONEY TO BUY MORE.: NEVER TRUE

## 2022-10-31 SDOH — ECONOMIC STABILITY: INCOME INSECURITY: IN THE LAST 12 MONTHS, WAS THERE A TIME WHEN YOU WERE NOT ABLE TO PAY THE MORTGAGE OR RENT ON TIME?: NO

## 2022-10-31 SDOH — ECONOMIC STABILITY: FOOD INSECURITY: WITHIN THE PAST 12 MONTHS, THE FOOD YOU BOUGHT JUST DIDN'T LAST AND YOU DIDN'T HAVE MONEY TO GET MORE.: NEVER TRUE

## 2022-10-31 ASSESSMENT — ENCOUNTER SYMPTOMS
CHEST TIGHTNESS: 0
ABDOMINAL PAIN: 0
SHORTNESS OF BREATH: 0
NAUSEA: 0
VOMITING: 0
COLOR CHANGE: 0

## 2022-10-31 ASSESSMENT — SOCIAL DETERMINANTS OF HEALTH (SDOH): HOW HARD IS IT FOR YOU TO PAY FOR THE VERY BASICS LIKE FOOD, HOUSING, MEDICAL CARE, AND HEATING?: NOT HARD AT ALL

## 2022-10-31 NOTE — PROGRESS NOTES
10/31/2022    This is a 77 y.o. female who presents for  Chief Complaint   Patient presents with    Follow-up       HPI:      6 mo f/u     Vitamin D supplement questions, taking 2K IU daily     + polyneuropathy  [facility]   Considering aquatic therapy, unable to do due to financial   Doing well on Gabapentin changes/increases  No AEs  Requests refills to the mail in pharmacy     HTN  No medications  Monitors at home: 120/70-80s  No acute concerning Sxs: No CP, SOB, palpitations, dizziness, HA, etc.   Tries to stay relaxed when coming in here for readings     Defers any vaccines  Declines mammogram   Colonoscopy due in 12/22, Q 3 yrs       Past Medical History:   Diagnosis Date    Allergic rhinitis     Arthritis     Dizziness     Hip pain     Hx of blood clots 2012    PE    Hypertension     Does not currently take any medications    IBS (irritable bowel syndrome)     Mixed hyperlipidemia 5/2/2022    Neuropathy     Pulmonary embolism (HCC)     Recurrent low back pain     Thyroid disease     enlarged    Tinnitus        Past Surgical History:   Procedure Laterality Date    COLONOSCOPY  10/26/2016    Polyps    FOOT SURGERY Left     THYROIDECTOMY Left 8/3/2020    LEFT THYROID LOBECTOMY WITH NERVE MONITORING  CPT CODE - 51167 performed by Bautista Gann DO at [address] [place] [address], PARTIAL Left 08/2020    TONSILLECTOMY         Social History     Socioeconomic History    Marital status:      Spouse name: Not on file    Number of children: 2    Years of education: Not on file    Highest education level: Not on file   Occupational History    Not on file   Tobacco Use    Smoking status: Never    Smokeless tobacco: Never   Vaping Use    Vaping Use: Never used   Substance and Sexual Activity    Alcohol use: No    Drug use: No    Sexual activity: Yes     Partners: Male     Comment: 2 cups /day   Other Topics Concern    Not on file   Social History Narrative    Not on file     Social Determinants of Health     Financial Resource Strain: Low Risk     Difficulty of Paying Living Expenses: Not hard at all   Food Insecurity: No Food Insecurity    Worried About [address] [employer] in the Last Year: Never true    Ran Out of Food in the Last Year: Never true   Transportation Needs: No Transportation Needs    Lack of Transportation (Medical): No    Lack of Transportation (Non-Medical):  No   Physical Activity: Sufficiently Active    Days of Exercise per Week: 6 days    Minutes of Exercise per Session: 30 min   Stress: Not on file   Social Connections: Not on file   Intimate Partner Violence: Not on file   Housing Stability: Low Risk     Unable to Pay for Housing in the Last Year: No    Number of Places Lived in the Last Year: 1    Unstable Housing in the Last Year: No       Family History   Problem Relation Age of Onset    Cancer Father     Heart Disease Sister     Other Sister         Pulmonary Embolism       Current Outpatient Medications   Medication Sig Dispense Refill    gabapentin (NEURONTIN) 400 MG capsule TAKE 1 CAPSULE WITH        BREAKFAST AND LUNCH 180 capsule 1    gabapentin (NEURONTIN) 300 MG capsule TAKE 2 CAPSULES IN THE     EVENING AND 2 CAPSULES     BEFORE BEDTIME 360 capsule 1    gabapentin (NEURONTIN) 100 MG capsule TAKE 1 CAPSULE EVERY       MORNING AND AT LUNCH, WITH  MG CAPSULE 180 capsule 1    Cholecalciferol (VITAMIN D3) 50 MCG (2000 UT) CAPS Take 2,000 Units by mouth once a week 2,000 units a week      diclofenac sodium (VOLTAREN) 1 % GEL Apply 2-4 g topically 4 times daily as needed for Pain 100 g 1    aspirin 81 MG chewable tablet Take 81 mg by mouth daily      Glucosamine-Chondroit-Vit C-Mn (GLUCOSAMINE 1500 COMPLEX) CAPS Take by mouth      melatonin 3 MG TABS tablet Take 10 mg by mouth daily Take one half tablet nightly       magnesium (MAGNESIUM-OXIDE) 250 MG TABS tablet Take 250 mg by mouth daily      Calcium Carbonate-Vit D-Min 2543-5268 MG-UNIT CHEW Take 600 mg by mouth daily        No current facility-administered medications for this visit. Immunization History   Administered Date(s) Administered    Hepatitis A Adult ([place]) 10/31/2018    Influenza Vaccine, unspecified formulation 10/10/2016    Influenza, FLUARIX, FLULAVAL, FLUZONE (age 10 mo+) AND AFLURIA, (age 1 y+), PF, 0.5mL 10/01/2018, 10/11/2019    Tdap (Boostrix, Adacel) 10/20/2011    Zoster Live (Zostavax) 11/29/2013    Zoster Recombinant (Shingrix) 09/13/2019, 12/27/2019       Allergies   Allergen Reactions    Penicillins Hives       Review of Systems   Constitutional:  Negative for activity change, appetite change, chills, diaphoresis, fatigue and fever. Eyes:  Negative for visual disturbance. Respiratory:  Negative for chest tightness and shortness of breath. Cardiovascular:  Negative for chest pain, palpitations and leg swelling. Gastrointestinal:  Negative for abdominal pain, nausea and vomiting. Genitourinary:  Negative for decreased urine volume. Musculoskeletal:  Positive for arthralgias. Skin:  Negative for color change. Neurological:  Negative for dizziness, weakness, light-headedness, numbness and headaches. /70   Pulse 76   Temp 98.2 °F (36.8 °C)   Ht 5' 10\" (1.778 m)   Wt 240 lb (108.9 kg)   LMP  (LMP Unknown)   SpO2 98%   BMI 34.44 kg/m²     Physical Exam  Vitals and nursing note reviewed. Constitutional:       General: She is not in acute distress. Appearance: She is well-developed. She is not diaphoretic. HENT:      Head: Normocephalic and atraumatic. Eyes:      Extraocular Movements: Extraocular movements intact. Conjunctiva/sclera: Conjunctivae normal.      Pupils: Pupils are equal, round, and reactive to light. Neck:      Vascular: No JVD. Cardiovascular:      Rate and Rhythm: Normal rate and regular rhythm. Heart sounds: Normal heart sounds. No murmur heard. No friction rub. No gallop.    Pulmonary:      Effort: Pulmonary effort is normal. No respiratory distress. Breath sounds: Normal breath sounds. No wheezing. Abdominal:      Palpations: Abdomen is soft. Musculoskeletal:         General: No swelling. Normal range of motion. Cervical back: Normal range of motion and neck supple. Skin:     General: Skin is warm and dry. Capillary Refill: Capillary refill takes 2 to 3 seconds. Findings: No erythema. Neurological:      General: No focal deficit present. Mental Status: She is alert and oriented to person, place, and time. Psychiatric:         Mood and Affect: Mood normal.         Behavior: Behavior normal.         Thought Content: Thought content normal.         Judgment: Judgment normal.       Plan  1. Essential hypertension  Stable, off medication     2. Generalized osteoarthritis  + polyneuropathy  Stable on Gabapentin as prescribed  [facility]atics [facility] the [facility]    3. Mixed hyperlipidemia  The 10-year ASCVD risk score (Coral CANO, et al., 2019) is: 5.4%    Values used to calculate the score:      Age: 77 years      Sex: Female      Is Non- : No      Diabetic: No      Tobacco smoker: No      Systolic Blood Pressure: 146 mmHg      Is BP treated: No      HDL Cholesterol: 57 mg/dL      Total Cholesterol: 177 mg/dL    4. Screening mammography declined      While assessing care for this patient, I have reviewed all pertinent lab work/imaging/ specialist notes and care in reference to those problems addressed above in detail. Appropriate medical decision making was based on this. Please note that portions of this note may have been completed with a voice recognition program. Efforts were made to edit the dictations but occasionally words are mis-transcribed. Return in about 6 months (around 4/30/2023) for 30 minute visit.

## 2023-02-28 ENCOUNTER — TELEMEDICINE (OUTPATIENT)
Dept: FAMILY MEDICINE CLINIC | Age: 68
End: 2023-02-28
Payer: COMMERCIAL

## 2023-02-28 DIAGNOSIS — E78.2 MIXED HYPERLIPIDEMIA: ICD-10-CM

## 2023-02-28 DIAGNOSIS — I26.99 PULMONARY EMBOLISM, OTHER, UNSPECIFIED CHRONICITY, UNSPECIFIED WHETHER ACUTE COR PULMONALE PRESENT (HCC): ICD-10-CM

## 2023-02-28 DIAGNOSIS — I10 ESSENTIAL HYPERTENSION: ICD-10-CM

## 2023-02-28 DIAGNOSIS — G62.9 POLYNEUROPATHY: Primary | ICD-10-CM

## 2023-02-28 DIAGNOSIS — E55.9 VITAMIN D DEFICIENCY: ICD-10-CM

## 2023-02-28 DIAGNOSIS — R73.9 HYPERGLYCEMIA: ICD-10-CM

## 2023-02-28 PROCEDURE — 99442 PR PHYS/QHP TELEPHONE EVALUATION 11-20 MIN: CPT | Performed by: FAMILY MEDICINE

## 2023-02-28 RX ORDER — GABAPENTIN 400 MG/1
CAPSULE ORAL
Qty: 180 CAPSULE | Refills: 1 | Status: SHIPPED | OUTPATIENT
Start: 2023-02-28 | End: 2023-08-21

## 2023-02-28 RX ORDER — GABAPENTIN 100 MG/1
CAPSULE ORAL
Qty: 180 CAPSULE | Refills: 1 | Status: SHIPPED | OUTPATIENT
Start: 2023-02-28 | End: 2023-08-21

## 2023-02-28 RX ORDER — GABAPENTIN 300 MG/1
CAPSULE ORAL
Qty: 360 CAPSULE | Refills: 1 | Status: SHIPPED | OUTPATIENT
Start: 2023-02-28 | End: 2023-08-21

## 2023-02-28 ASSESSMENT — PATIENT HEALTH QUESTIONNAIRE - PHQ9: DEPRESSION UNABLE TO ASSESS: PT REFUSES

## 2023-02-28 NOTE — PROGRESS NOTES
Diane Kothari is a 79 y.o. female evaluated via telephone on 4/17/2020. Consent:  She and/or health care decision maker is aware that that she may receive a bill for this telephone service, depending on her insurance coverage, and has provided verbal consent to proceed: Yes      Documentation:  I communicated with the patient and/or health care decision maker about:    New insurance is requiring this visit     Longstanding hx of + polyneuropathy  [facility]   Considering aquatic therapy, unable to do due to financial strains   Doing well on Gabapentin changes/increases  Has been on this medication regiment chronically   No AEs  Requests refills to the mail in pharmacy    Questions about Vitamin D 3:   Taking for 2-4 mo  Inquiring about needed labs     Zinc supplement 227% above  B complex with Biotin 33k % up      Details of this discussion including any medical advice provided:     1. Polyneuropathy  Well controlled on current regiment  - gabapentin (NEURONTIN) 100 MG capsule; TAKE 1 CAPSULE EVERY       MORNING AND AT LUNCH, WITH  MG CAPSULE  Dispense: 180 capsule; Refill: 1  - gabapentin (NEURONTIN) 300 MG capsule; Take 1 tab daily  Dispense: 360 capsule; Refill: 1  - gabapentin (NEURONTIN) 400 MG capsule; Take 1 tab daily  Dispense: 180 capsule; Refill: 1  - Comprehensive Metabolic Panel; Future  - CBC with Auto Differential; Future  - Hemoglobin A1C; Future  - Lipid Panel; Future  - Vitamin D 25 Hydroxy; Future  - TSH with Reflex; Future  - Magnesium; Future  - Vitamin B12; Future  - Folate; Future    2.  Mixed hyperlipidemia  The 10-year ASCVD risk score (Coral CANO, et al., 2019) is: 6.1%    Values used to calculate the score:      Age: 79 years      Sex: Female      Is Non- : No      Diabetic: No      Tobacco smoker: No      Systolic Blood Pressure: 404 mmHg      Is BP treated: No      HDL Cholesterol: 57 mg/dL      Total Cholesterol: 177 mg/dL  - Lipid Panel; Future    3. Pulmonary embolism, other, unspecified chronicity, unspecified whether acute cor pulmonale present (HCC)  No new concerns     4. Essential hypertension  Well controlled on current regiment  - Comprehensive Metabolic Panel; Future  - CBC with Auto Differential; Future  - TSH with Reflex; Future    5. Vitamin D deficiency  On supplementation   - Vitamin D 25 Hydroxy; Future    6. Hyperglycemia  - Hemoglobin A1C; Future        I affirm this is a Patient Initiated Episode with an Established Patient who has not had a related appointment within my department in the past 7 days or scheduled within the next 24 hours.    Total Time: minutes: 11-20 minutes    Note: not billable if this call serves to triage the patient into an appointment for the relevant concern    Dr. Nallely Andre MD  2/28/23

## 2023-02-28 NOTE — PROGRESS NOTES
2023    TELEHEALTH EVALUATION -- Audio/Visual (During KFHXV-84 public health emergency)    HPI:    Dena Andino (:  1955) has requested an audio/video evaluation for the following concern(s):    Chief Complaint   Patient presents with    Medication Check     Discuss medications and refills     + polyneuropathy  [facility]   Considering aquatic therapy, unable to do due to financial   Doing well on Gabapentin changes/increases  No AEs  Requests refills to the mail in pharmacy    Review of Systems    Prior to Visit Medications    Medication Sig Taking?  Authorizing Provider   gabapentin (NEURONTIN) 400 MG capsule TAKE 1 CAPSULE WITH        BREAKFAST AND LUNCH Yes Mayte Ledesma MD   gabapentin (NEURONTIN) 300 MG capsule TAKE 2 CAPSULES IN THE     EVENING AND 2 CAPSULES     BEFORE BEDTIME Yes Mayte Ledesma MD   gabapentin (NEURONTIN) 100 MG capsule TAKE 1 CAPSULE EVERY       MORNING AND AT LUNCH, WITH  MG CAPSULE Yes Mayte Ledesma MD   Cholecalciferol (VITAMIN D3) 50 MCG (2000 UT) CAPS Take 2,000 Units by mouth once a week 2,000 units a week Yes Historical Provider, MD   diclofenac sodium (VOLTAREN) 1 % GEL Apply 2-4 g topically 4 times daily as needed for Pain Yes Mayte Ledesma MD   aspirin 81 MG chewable tablet Take 81 mg by mouth daily Yes Historical Provider, MD   Glucosamine-Chondroit-Vit C-Mn (GLUCOSAMINE 1500 COMPLEX) CAPS Take by mouth Yes Historical Provider, MD   melatonin 3 MG TABS tablet Take 10 mg by mouth daily Take one half tablet nightly  Yes Historical Provider, MD   magnesium (MAGNESIUM-OXIDE) 250 MG TABS tablet Take 250 mg by mouth daily Yes Historical Provider, MD   Calcium Carbonate-Vit D-Min 0049-1636 MG-UNIT CHEW Take 600 mg by mouth daily  Yes Historical Provider, MD       Social History     Tobacco Use    Smoking status: Never    Smokeless tobacco: Never   Vaping Use    Vaping Use: Never used   Substance Use Topics    Alcohol use: No    Drug use: No        Allergies   Allergen Reactions    Penicillins Hives   ,   Past Medical History:   Diagnosis Date    Allergic rhinitis     Arthritis     Dizziness     Hip pain     Hx of blood clots 2012    PE    Hypertension     Does not currently take any medications    IBS (irritable bowel syndrome)     Mixed hyperlipidemia 5/2/2022    Neuropathy     Pulmonary embolism (HCC)     Recurrent low back pain     Thyroid disease     enlarged    Tinnitus    ,   Past Surgical History:   Procedure Laterality Date    COLONOSCOPY  10/26/2016    Polyps    FOOT SURGERY Left     THYROIDECTOMY Left 8/3/2020    LEFT THYROID LOBECTOMY WITH NERVE MONITORING  CPT CODE - 06457 performed by Elly Garcia DO at [address] [facility], PARTIAL Left 08/2020    TONSILLECTOMY     ,   Social History     Tobacco Use    Smoking status: Never    Smokeless tobacco: Never   Vaping Use    Vaping Use: Never used   Substance Use Topics    Alcohol use: No    Drug use: No   ,   Family History   Problem Relation Age of Onset    Cancer Father     Heart Disease Sister     Other Sister         Pulmonary Embolism   ,   Immunization History   Administered Date(s) Administered    Hepatitis A Adult (Vaqta) 10/31/2018    Influenza Vaccine, unspecified formulation 10/10/2016    Influenza, FLUARIX, FLULAVAL, FLUZONE (age 10 mo+) AND AFLURIA, (age 1 y+), PF, 0.5mL 10/01/2018, 10/11/2019    Tdap (Boostrix, Adacel) 10/20/2011    Zoster Live (Zostavax) 11/29/2013    Zoster Recombinant (Shingrix) 09/13/2019, 12/27/2019   ,   Health Maintenance   Topic Date Due    COVID-19 Vaccine (1) Never done    Breast cancer screen  04/22/2016    Pneumococcal 65+ years Vaccine (1 - PCV) Never done    DTaP/Tdap/Td vaccine (2 - Td or Tdap) 10/20/2021    Flu vaccine (1) 08/01/2022    Depression Screen  06/02/2023    Annual Wellness Visit (AWV)  06/10/2023    Lipids  07/15/2027    Colorectal Cancer Screen  12/07/2027    DEXA (modify frequency per FRAX score)  Completed Shingles vaccine  Completed    Hepatitis C screen  Completed    Hepatitis A vaccine  Aged Out    Hib vaccine  Aged Out    Meningococcal (ACWY) vaccine  Aged Out       PHYSICAL EXAMINATION:  [ INSTRUCTIONS:  \"[x]\" Indicates a positive item  \"[]\" Indicates a negative item  -- DELETE ALL ITEMS NOT EXAMINED]  Vital Signs: (As obtained by patient/caregiver or practitioner observation)    Blood pressure-  Heart rate-    Respiratory rate-    Temperature-  Pulse oximetry-     Constitutional: [x] Appears well-developed and well-nourished [x] No apparent distress      [] Abnormal-   Mental status  [x] Alert and awake  [] Oriented to person/place/time [x]Able to follow commands      Eyes:  EOM    [x]  Normal  [] Abnormal-  Sclera  [x]  Normal  [] Abnormal -         Discharge []  None visible  [] Abnormal -    HENT:   [x] Normocephalic, atraumatic. [x] Abnormal   [] Mouth/Throat: Mucous membranes are moist.     External Ears [x] Normal  [] Abnormal-     Neck: [x] No visualized mass     Pulmonary/Chest: [x] Respiratory effort normal.  [x] No visualized signs of difficulty breathing or respiratory distress        [] Abnormal-      Musculoskeletal:   [] Normal gait with no signs of ataxia         [x] Normal range of motion of neck        [] Abnormal-       Neurological:        [x] No Facial Asymmetry (Cranial nerve 7 motor function) (limited exam to video visit)          [] No gaze palsy        [] Abnormal-         Skin:        [x] No significant exanthematous lesions or discoloration noted on facial skin         [] Abnormal-            Psychiatric:       [x] Normal Affect [] No Hallucinations        [] Abnormal-     Other pertinent observable physical exam findings-     ASSESSMENT/PLAN:  1. Polyneuropathy  ***      No follow-ups on file. Diane Kothari is a 79 y.o. female being evaluated by a Virtual Visit (video visit) encounter to address concerns as mentioned above. A caregiver was present when appropriate.  Due to this being a TeleHealth encounter (During MQZIN-19 public health emergency), evaluation of the following organ systems was limited: Vitals/Constitutional/EENT/Resp/CV/GI//MS/Neuro/Skin/Heme-Lymph-Imm. Pursuant to the emergency declaration under the [address] [place] [address], [address] [place] [address] and the [employer] Act, this Virtual Visit was conducted with patient's (and/or legal guardian's) consent, to reduce the patient's risk of exposure to COVID-19 and provide necessary medical care. The patient (and/or legal guardian) has also been advised to contact this office for worsening conditions or problems, and seek emergency medical treatment and/or call 911 if deemed necessary. Services were provided through a video synchronous discussion virtually to substitute for in-person clinic visit. Patient and provider were located at their individual homes. --Do Cox MD on 2/28/2023 at 11:57 AM    An electronic signature was used to authenticate this note.

## 2023-03-09 ENCOUNTER — TELEPHONE (OUTPATIENT)
Dept: FAMILY MEDICINE CLINIC | Age: 68
End: 2023-03-09

## 2023-03-09 NOTE — TELEPHONE ENCOUNTER
Kenya Martinez [facility] Practice Support (supporting Iliana Bauman MD) 16 hours ago (5:17 PM)     I did not receive the 300mg and 400mg dosage of gabapentin I have been taking  and received a notice from my insurance co. that this exceeded the limit and I would need an exception. They have requested another script  for the remainder. We did not discuss taking less, so I'm wondering if you intended to keep the dosage the same.      Thank you,     Lisandro Dixon

## 2023-03-10 NOTE — TELEPHONE ENCOUNTER
[facility] Practice Support (supporting Lorena Arguello MD) 1 hour ago (10:28 AM)     Rhett Underwood,  Sorry to be a bother, but I need to let you know after 3 days of lesser dose I am having a hyper-sensitive feeling in my feet making sleep difficult (can't stand covers on my feet), have experienced sharp pains from the arch down to my toes, a \"burning\" cold in the lower extremities more than before, and wrist pain. My concern is if the ins. co. does not approve an exception, I will need to find another and only have until the end of the month to make a change. I am more than willing to try to eliminate the 100's but need more time to try but am running out of time due to the approval process.      Rai

## 2023-03-10 NOTE — TELEPHONE ENCOUNTER
Patient currently takes:   Breakfast: 100mg and 400mg  Lunch: 100mg and 400mg  Dinner: 300mg and 300mg  Bedtime: 300mg and 300mg    Patient likes the capsules as she has to crush the tablets, she would be willing to take tablet if she has to. Her insurance prefers the tablets.

## 2023-03-13 RX ORDER — GABAPENTIN 400 MG/1
CAPSULE ORAL
Qty: 450 CAPSULE | Refills: 1 | Status: SHIPPED | OUTPATIENT
Start: 2023-03-13 | End: 2023-04-12

## 2023-03-13 NOTE — TELEPHONE ENCOUNTER
Talked with pt. Changed medication regiment for ease  400 mg at breakfast, lunch, and dinner, 800 mg at bedtime in capsule form  3 mo supply sent to [facility] mail in pharmacy. Pt agreeable to plan and aware.

## 2023-03-16 DIAGNOSIS — R73.9 HYPERGLYCEMIA: ICD-10-CM

## 2023-03-16 DIAGNOSIS — G62.9 POLYNEUROPATHY: ICD-10-CM

## 2023-03-16 DIAGNOSIS — I10 ESSENTIAL HYPERTENSION: ICD-10-CM

## 2023-03-16 DIAGNOSIS — E78.2 MIXED HYPERLIPIDEMIA: ICD-10-CM

## 2023-03-16 DIAGNOSIS — E55.9 VITAMIN D DEFICIENCY: ICD-10-CM

## 2023-03-16 LAB
25(OH)D3 SERPL-MCNC: 41.6 NG/ML
ALBUMIN SERPL-MCNC: 4 G/DL (ref 3.4–5)
ALBUMIN/GLOB SERPL: 1.4 {RATIO} (ref 1.1–2.2)
ALP SERPL-CCNC: 75 U/L (ref 40–129)
ALT SERPL-CCNC: 14 U/L (ref 10–40)
ANION GAP SERPL CALCULATED.3IONS-SCNC: 14 MMOL/L (ref 3–16)
AST SERPL-CCNC: 16 U/L (ref 15–37)
BASOPHILS # BLD: 0 K/UL (ref 0–0.2)
BASOPHILS NFR BLD: 0.7 %
BILIRUB SERPL-MCNC: <0.2 MG/DL (ref 0–1)
BUN SERPL-MCNC: 12 MG/DL (ref 7–20)
CALCIUM SERPL-MCNC: 9 MG/DL (ref 8.3–10.6)
CHLORIDE SERPL-SCNC: 108 MMOL/L (ref 99–110)
CHOLEST SERPL-MCNC: 159 MG/DL (ref 0–199)
CO2 SERPL-SCNC: 25 MMOL/L (ref 21–32)
CREAT SERPL-MCNC: 0.7 MG/DL (ref 0.6–1.2)
DEPRECATED RDW RBC AUTO: 13.9 % (ref 12.4–15.4)
EOSINOPHIL # BLD: 0.1 K/UL (ref 0–0.6)
EOSINOPHIL NFR BLD: 4.4 %
FOLATE SERPL-MCNC: 12.64 NG/ML (ref 4.78–24.2)
GFR SERPLBLD CREATININE-BSD FMLA CKD-EPI: >60 ML/MIN/{1.73_M2}
GLUCOSE SERPL-MCNC: 97 MG/DL (ref 70–99)
HCT VFR BLD AUTO: 41.8 % (ref 36–48)
HDLC SERPL-MCNC: 51 MG/DL (ref 40–60)
HGB BLD-MCNC: 13.8 G/DL (ref 12–16)
LDLC SERPL CALC-MCNC: 89 MG/DL
LYMPHOCYTES # BLD: 1.3 K/UL (ref 1–5.1)
LYMPHOCYTES NFR BLD: 39 %
MAGNESIUM SERPL-MCNC: 2.3 MG/DL (ref 1.8–2.4)
MCH RBC QN AUTO: 30.2 PG (ref 26–34)
MCHC RBC AUTO-ENTMCNC: 33 G/DL (ref 31–36)
MCV RBC AUTO: 91.5 FL (ref 80–100)
MONOCYTES # BLD: 0.4 K/UL (ref 0–1.3)
MONOCYTES NFR BLD: 11.3 %
NEUTROPHILS # BLD: 1.5 K/UL (ref 1.7–7.7)
NEUTROPHILS NFR BLD: 44.6 %
PLATELET # BLD AUTO: 181 K/UL (ref 135–450)
PMV BLD AUTO: 8.9 FL (ref 5–10.5)
POTASSIUM SERPL-SCNC: 4.7 MMOL/L (ref 3.5–5.1)
PROT SERPL-MCNC: 6.9 G/DL (ref 6.4–8.2)
RBC # BLD AUTO: 4.57 M/UL (ref 4–5.2)
SODIUM SERPL-SCNC: 147 MMOL/L (ref 136–145)
TRIGL SERPL-MCNC: 96 MG/DL (ref 0–150)
TSH SERPL DL<=0.005 MIU/L-ACNC: 3.25 UIU/ML (ref 0.27–4.2)
VIT B12 SERPL-MCNC: 308 PG/ML (ref 211–911)
VLDLC SERPL CALC-MCNC: 19 MG/DL
WBC # BLD AUTO: 3.4 K/UL (ref 4–11)

## 2023-03-17 LAB
EST. AVERAGE GLUCOSE BLD GHB EST-MCNC: 108.3 MG/DL
HBA1C MFR BLD: 5.4 %

## 2023-04-12 ENCOUNTER — OFFICE VISIT (OUTPATIENT)
Dept: FAMILY MEDICINE CLINIC | Age: 68
End: 2023-04-12
Payer: COMMERCIAL

## 2023-04-12 VITALS
TEMPERATURE: 98.1 F | DIASTOLIC BLOOD PRESSURE: 62 MMHG | HEIGHT: 70 IN | WEIGHT: 232 LBS | SYSTOLIC BLOOD PRESSURE: 112 MMHG | HEART RATE: 92 BPM | OXYGEN SATURATION: 98 % | BODY MASS INDEX: 33.21 KG/M2

## 2023-04-12 DIAGNOSIS — N84.1 CERVICAL POLYP: Primary | ICD-10-CM

## 2023-04-12 PROCEDURE — 3074F SYST BP LT 130 MM HG: CPT | Performed by: FAMILY MEDICINE

## 2023-04-12 PROCEDURE — 99214 OFFICE O/P EST MOD 30 MIN: CPT | Performed by: FAMILY MEDICINE

## 2023-04-12 PROCEDURE — 1123F ACP DISCUSS/DSCN MKR DOCD: CPT | Performed by: FAMILY MEDICINE

## 2023-04-12 PROCEDURE — 3078F DIAST BP <80 MM HG: CPT | Performed by: FAMILY MEDICINE

## 2023-04-12 ASSESSMENT — ANXIETY QUESTIONNAIRES
1. FEELING NERVOUS, ANXIOUS, OR ON EDGE: 0
6. BECOMING EASILY ANNOYED OR IRRITABLE: 0
5. BEING SO RESTLESS THAT IT IS HARD TO SIT STILL: 0
3. WORRYING TOO MUCH ABOUT DIFFERENT THINGS: 0
2. NOT BEING ABLE TO STOP OR CONTROL WORRYING: 0
4. TROUBLE RELAXING: 0
7. FEELING AFRAID AS IF SOMETHING AWFUL MIGHT HAPPEN: 0
IF YOU CHECKED OFF ANY PROBLEMS ON THIS QUESTIONNAIRE, HOW DIFFICULT HAVE THESE PROBLEMS MADE IT FOR YOU TO DO YOUR WORK, TAKE CARE OF THINGS AT HOME, OR GET ALONG WITH OTHER PEOPLE: NOT DIFFICULT AT ALL
GAD7 TOTAL SCORE: 0

## 2023-04-12 ASSESSMENT — PATIENT HEALTH QUESTIONNAIRE - PHQ9
8. MOVING OR SPEAKING SO SLOWLY THAT OTHER PEOPLE COULD HAVE NOTICED. OR THE OPPOSITE, BEING SO FIGETY OR RESTLESS THAT YOU HAVE BEEN MOVING AROUND A LOT MORE THAN USUAL: 0
SUM OF ALL RESPONSES TO PHQ QUESTIONS 1-9: 1
1. LITTLE INTEREST OR PLEASURE IN DOING THINGS: 0
SUM OF ALL RESPONSES TO PHQ QUESTIONS 1-9: 1
5. POOR APPETITE OR OVEREATING: 0
SUM OF ALL RESPONSES TO PHQ QUESTIONS 1-9: 1
2. FEELING DOWN, DEPRESSED OR HOPELESS: 0
9. THOUGHTS THAT YOU WOULD BE BETTER OFF DEAD, OR OF HURTING YOURSELF: 0
SUM OF ALL RESPONSES TO PHQ9 QUESTIONS 1 & 2: 0
SUM OF ALL RESPONSES TO PHQ QUESTIONS 1-9: 1
7. TROUBLE CONCENTRATING ON THINGS, SUCH AS READING THE NEWSPAPER OR WATCHING TELEVISION: 0
3. TROUBLE FALLING OR STAYING ASLEEP: 0
6. FEELING BAD ABOUT YOURSELF - OR THAT YOU ARE A FAILURE OR HAVE LET YOURSELF OR YOUR FAMILY DOWN: 0
4. FEELING TIRED OR HAVING LITTLE ENERGY: 1
10. IF YOU CHECKED OFF ANY PROBLEMS, HOW DIFFICULT HAVE THESE PROBLEMS MADE IT FOR YOU TO DO YOUR WORK, TAKE CARE OF THINGS AT HOME, OR GET ALONG WITH OTHER PEOPLE: 0

## 2023-04-25 ASSESSMENT — ENCOUNTER SYMPTOMS
SHORTNESS OF BREATH: 0
ABDOMINAL DISTENTION: 0
BACK PAIN: 1
COLOR CHANGE: 0
CHEST TIGHTNESS: 0
VOMITING: 0
DIARRHEA: 0
ABDOMINAL PAIN: 0
CONSTIPATION: 0
NAUSEA: 0

## 2023-04-25 NOTE — PROGRESS NOTES
4/12/2023    This is a 79 y.o. female who presents for  Chief Complaint   Patient presents with    Follow-up     HTN follow up/pelivc       HPI:     HTN:  Taking medication(s) daily  No new AEs to the medication(s)  No acute concerning Sxs: No CP, SOB, palpitations, dizziness, HA, etc.     No LMP recorded (lmp unknown). Patient is postmenopausal.  Denies any vaginal discharge, new pelvic pain, dyspareunia, AUB  No new sexual partners    Was supposed to follow up prior to this to monitor a cervical polyp. Pap smear was reassuring at the time.         Past Medical History:   Diagnosis Date    Allergic rhinitis     Arthritis     Dizziness     Hip pain     Hx of blood clots 2012    PE    Hypertension     Does not currently take any medications    IBS (irritable bowel syndrome)     Mixed hyperlipidemia 5/2/2022    Neuropathy     Pulmonary embolism (HCC)     Recurrent low back pain     Thyroid disease     enlarged    Tinnitus        Past Surgical History:   Procedure Laterality Date    COLONOSCOPY  10/26/2016    Polyps    FOOT SURGERY Left     THYROIDECTOMY Left 8/3/2020    LEFT THYROID LOBECTOMY WITH NERVE MONITORING  CPT CODE - 89701 performed by Dany Simms DO at [address] [place] [address], PARTIAL Left 08/2020    TONSILLECTOMY         Social History     Socioeconomic History    Marital status:      Spouse name: Not on file    Number of children: 2    Years of education: Not on file    Highest education level: Not on file   Occupational History    Not on file   Tobacco Use    Smoking status: Never    Smokeless tobacco: Never   Vaping Use    Vaping Use: Never used   Substance and Sexual Activity    Alcohol use: No    Drug use: No    Sexual activity: Yes     Partners: Male     Comment: 2 cups /day   Other Topics Concern    Not on file   Social History Narrative    Not on file     Social Determinants of Health     Financial Resource Strain: Low Risk     Difficulty of Paying Living Expenses: Not hard at all   Food

## 2023-05-06 SDOH — HEALTH STABILITY: PHYSICAL HEALTH: ON AVERAGE, HOW MANY DAYS PER WEEK DO YOU ENGAGE IN MODERATE TO STRENUOUS EXERCISE (LIKE A BRISK WALK)?: 6 DAYS

## 2023-05-06 SDOH — HEALTH STABILITY: PHYSICAL HEALTH: ON AVERAGE, HOW MANY MINUTES DO YOU ENGAGE IN EXERCISE AT THIS LEVEL?: 30 MIN

## 2023-05-06 ASSESSMENT — SOCIAL DETERMINANTS OF HEALTH (SDOH)
WITHIN THE LAST YEAR, HAVE TO BEEN RAPED OR FORCED TO HAVE ANY KIND OF SEXUAL ACTIVITY BY YOUR PARTNER OR EX-PARTNER?: NO
WITHIN THE LAST YEAR, HAVE YOU BEEN HUMILIATED OR EMOTIONALLY ABUSED IN OTHER WAYS BY YOUR PARTNER OR EX-PARTNER?: NO
WITHIN THE LAST YEAR, HAVE YOU BEEN KICKED, HIT, SLAPPED, OR OTHERWISE PHYSICALLY HURT BY YOUR PARTNER OR EX-PARTNER?: NO
WITHIN THE LAST YEAR, HAVE YOU BEEN AFRAID OF YOUR PARTNER OR EX-PARTNER?: NO

## 2023-05-09 ENCOUNTER — OFFICE VISIT (OUTPATIENT)
Dept: FAMILY MEDICINE CLINIC | Age: 68
End: 2023-05-09

## 2023-05-09 VITALS
WEIGHT: 231.4 LBS | HEART RATE: 85 BPM | RESPIRATION RATE: 16 BRPM | OXYGEN SATURATION: 97 % | BODY MASS INDEX: 36.32 KG/M2 | SYSTOLIC BLOOD PRESSURE: 120 MMHG | DIASTOLIC BLOOD PRESSURE: 74 MMHG | HEIGHT: 67 IN

## 2023-05-09 DIAGNOSIS — I10 ESSENTIAL HYPERTENSION: ICD-10-CM

## 2023-05-09 DIAGNOSIS — G62.9 POLYNEUROPATHY: Primary | ICD-10-CM

## 2023-05-09 DIAGNOSIS — Z76.89 ENCOUNTER TO ESTABLISH CARE: ICD-10-CM

## 2023-05-09 DIAGNOSIS — E66.01 SEVERE OBESITY (BMI 35.0-39.9) WITH COMORBIDITY (HCC): ICD-10-CM

## 2023-05-09 DIAGNOSIS — E78.2 MIXED HYPERLIPIDEMIA: ICD-10-CM

## 2023-05-09 RX ORDER — GABAPENTIN 100 MG/1
100 CAPSULE ORAL DAILY
COMMUNITY

## 2023-05-09 ASSESSMENT — ENCOUNTER SYMPTOMS
SHORTNESS OF BREATH: 0
RHINORRHEA: 0
COUGH: 0
BLOOD IN STOOL: 0

## 2023-05-09 NOTE — PROGRESS NOTES
Never done    DTaP/Tdap/Td vaccine (2 - Td or Tdap) 10/20/2021    Annual Wellness Visit (AWV)  06/10/2023    Flu vaccine (Season Ended) 08/01/2023    Depression Screen  04/12/2024    Colorectal Cancer Screen  12/07/2027    Lipids  03/16/2028    DEXA (modify frequency per FRAX score)  Completed    Shingles vaccine  Completed    Hepatitis C screen  Completed    Hepatitis A vaccine  Aged Out    Hib vaccine  Aged Out    Meningococcal (ACWY) vaccine  Aged Out    HIV screen  Discontinued    Cervical cancer screen  Discontinued       PSH, PMH, SH and FH reviewed and noted. Recent and past labs, tests and consults also reviewed. Recent or new meds also reviewed. Georgina Pan DO    This dictation was generated by voice recognition computer software. Although all attempts are made to edit the dictation for accuracy, there may be errors in the transcription that are not intended.

## 2023-06-02 ENCOUNTER — TELEPHONE (OUTPATIENT)
Dept: OBGYN CLINIC | Age: 68
End: 2023-06-02

## 2023-06-02 NOTE — TELEPHONE ENCOUNTER
Pt states she was seen yesterday at the [facility] location and she was told to schedule an US regarding a Polyp. I do not see follow up instructions in visit notes, please advise so I can schedule appropriately?

## 2023-06-06 ENCOUNTER — TELEPHONE (OUTPATIENT)
Dept: FAMILY MEDICINE CLINIC | Age: 68
End: 2023-06-06

## 2023-06-18 PROBLEM — N84.1 CERVICAL POLYP: Status: ACTIVE | Noted: 2023-06-18

## 2023-06-29 ENCOUNTER — TELEPHONE (OUTPATIENT)
Dept: FAMILY MEDICINE CLINIC | Age: 68
End: 2023-06-29

## 2023-07-03 SDOH — HEALTH STABILITY: PHYSICAL HEALTH: ON AVERAGE, HOW MANY MINUTES DO YOU ENGAGE IN EXERCISE AT THIS LEVEL?: 30 MIN

## 2023-07-03 SDOH — HEALTH STABILITY: PHYSICAL HEALTH: ON AVERAGE, HOW MANY DAYS PER WEEK DO YOU ENGAGE IN MODERATE TO STRENUOUS EXERCISE (LIKE A BRISK WALK)?: 6 DAYS

## 2023-07-03 ASSESSMENT — LIFESTYLE VARIABLES
HOW MANY STANDARD DRINKS CONTAINING ALCOHOL DO YOU HAVE ON A TYPICAL DAY: PATIENT DOES NOT DRINK
HOW OFTEN DO YOU HAVE SIX OR MORE DRINKS ON ONE OCCASION: 1
HOW OFTEN DO YOU HAVE A DRINK CONTAINING ALCOHOL: 1
HOW MANY STANDARD DRINKS CONTAINING ALCOHOL DO YOU HAVE ON A TYPICAL DAY: 0
HOW OFTEN DO YOU HAVE A DRINK CONTAINING ALCOHOL: NEVER

## 2023-07-03 ASSESSMENT — PATIENT HEALTH QUESTIONNAIRE - PHQ9
SUM OF ALL RESPONSES TO PHQ QUESTIONS 1-9: 0
SUM OF ALL RESPONSES TO PHQ QUESTIONS 1-9: 0
1. LITTLE INTEREST OR PLEASURE IN DOING THINGS: 0
2. FEELING DOWN, DEPRESSED OR HOPELESS: 0
SUM OF ALL RESPONSES TO PHQ9 QUESTIONS 1 & 2: 0
SUM OF ALL RESPONSES TO PHQ QUESTIONS 1-9: 0
SUM OF ALL RESPONSES TO PHQ QUESTIONS 1-9: 0

## 2023-07-05 ENCOUNTER — TELEMEDICINE (OUTPATIENT)
Dept: FAMILY MEDICINE CLINIC | Age: 68
End: 2023-07-05
Payer: COMMERCIAL

## 2023-07-05 DIAGNOSIS — Z00.00 MEDICARE ANNUAL WELLNESS VISIT, SUBSEQUENT: Primary | ICD-10-CM

## 2023-07-05 PROCEDURE — 1123F ACP DISCUSS/DSCN MKR DOCD: CPT | Performed by: STUDENT IN AN ORGANIZED HEALTH CARE EDUCATION/TRAINING PROGRAM

## 2023-07-05 PROCEDURE — G0439 PPPS, SUBSEQ VISIT: HCPCS | Performed by: STUDENT IN AN ORGANIZED HEALTH CARE EDUCATION/TRAINING PROGRAM

## 2023-07-05 RX ORDER — AMOXICILLIN 500 MG
CAPSULE ORAL
COMMUNITY

## 2023-07-05 NOTE — PROGRESS NOTES
gabapentin (NEURONTIN) 100 MG capsule Take 1 capsule by mouth daily. Pt taking 400 mg and 100 mg total  Historical Provider, MD   gabapentin (NEURONTIN) 400 MG capsule Take 400 mg at breakfast, 400 mg at lunch, 400 mg at dinner, and 800 mg at bedtime. Patient taking differently: Take 1 capsule by mouth 3 times daily. Take 400 mg at breakfast, 400 mg at lunch, 400 mg at dinner, and 800 mg at bedtime. Tasia Nolen MD   Cholecalciferol (VITAMIN D3) 50 MCG (2000 UT) CAPS Take 10 capsules by mouth once a week 20,000 units a week  Historical Provider, MD   diclofenac sodium (VOLTAREN) 1 % GEL Apply 2-4 g topically 4 times daily as needed for Pain  Tasia Nolen MD   aspirin 81 MG chewable tablet Take 1 tablet by mouth daily  Historical Provider, MD   Glucosamine-Chondroit-Vit C-Mn (GLUCOSAMINE 1500 COMPLEX) CAPS Take by mouth  Historical Provider, MD   melatonin 3 MG TABS tablet Take 10 mg by mouth daily Take one half tablet nightly   Historical Provider, MD   magnesium (MAGNESIUM-OXIDE) 250 MG TABS tablet Take 1 tablet by mouth daily  Historical Provider, MD Pak (Including outside providers/suppliers regularly involved in providing care):   Patient Care Team:  Tracey Martinez DO as PCP - General (Family Medicine)  Tracey Martinez DO as PCP - Empaneled Provider  Victoriano Kong DO as Consulting Physician (Orthopedic Surgery)  Radha House MD as Consulting Physician (Physical Medicine and Rehab)  Norm Jane MD (Endocrinology)     Reviewed and updated this visit:  Tobacco  Allergies  Meds  Med Hx  Surg Hx  Soc Hx  Fam Hx         Zainab Neff, was evaluated through a synchronous (real-time) audio-video encounter. The patient (or guardian if applicable) is aware that this is a billable service, which includes applicable co-pays. This Virtual Visit was conducted with patient's (and/or legal guardian's) consent.  Patient identification was verified, and a caregiver was

## 2023-07-05 NOTE — PATIENT INSTRUCTIONS
Personalized Preventive Plan for Bianca Martinez - 7/5/2023  Medicare offers a range of preventive health benefits. Some of the tests and screenings are paid in full while other may be subject to a deductible, co-insurance, and/or copay. Some of these benefits include a comprehensive review of your medical history including lifestyle, illnesses that may run in your family, and various assessments and screenings as appropriate. After reviewing your medical record and screening and assessments performed today your provider may have ordered immunizations, labs, imaging, and/or referrals for you. A list of these orders (if applicable) as well as your Preventive Care list are included within your After Visit Summary for your review. Other Preventive Recommendations:    A preventive eye exam performed by an eye specialist is recommended every 1-2 years to screen for glaucoma; cataracts, macular degeneration, and other eye disorders. A preventive dental visit is recommended every 6 months. Try to get at least 150 minutes of exercise per week or 10,000 steps per day on a pedometer . Order or download the FREE \"Exercise & Physical Activity: Your Everyday Guide\" from [employer]. Call 0-569.566.5825 or search [employer] online. You need 0962-7583 mg of calcium and 3424-6354 IU of vitamin D per day. It is possible to meet your calcium requirement with diet alone, but a vitamin D supplement is usually necessary to meet this goal.  When exposed to the sun, use a sunscreen that protects against both UVA and UVB radiation with an SPF of 30 or greater. Reapply every 2 to 3 hours or after sweating, drying off with a towel, or swimming. Always wear a seat belt when traveling in a car. Always wear a helmet when riding a bicycle or motorcycle.        Advance Directives: Care Instructions  Overview  An advance directive is a legal way to state your wishes at the end of your

## 2023-07-11 ENCOUNTER — OFFICE VISIT (OUTPATIENT)
Dept: OBGYN CLINIC | Age: 68
End: 2023-07-11

## 2023-07-11 VITALS
SYSTOLIC BLOOD PRESSURE: 124 MMHG | BODY MASS INDEX: 36.74 KG/M2 | WEIGHT: 228.6 LBS | TEMPERATURE: 98.4 F | DIASTOLIC BLOOD PRESSURE: 80 MMHG | HEIGHT: 66 IN

## 2023-07-11 DIAGNOSIS — N84.1 CERVICAL POLYP: Primary | ICD-10-CM

## 2023-07-11 NOTE — PROGRESS NOTES
Cervical Polypectomy Procedure Note      Indication: Cervical polyp    Consent:  The risks of the procedure were discussed with the patient. She was aware these risks include, but are not limited to bleeding, infection and damage to surrounding tissue. She elected to proceed with the endometrial biopsy. Written consent was obtained. Procedure Details: The patient was placed in the dorsal lithotomy position. A speculum was placed in the vagina, and the cervix was cleansed with betadine. Polyp was visualized at the cervical os. The polyp was grasped using ring forcep and was removed using gentle traction and a twisting motion. Polyp was handed off to be sent to pathology. Hemostasis was achieved using Silver nitrate. Patient tolerated the procedure well. EBL: Minimal    Specimen: Cervical lesion    Complications: None    Plan:   Patient was instructed to take OTC motrin and/or tylenol for pain control  After care instructions discussed. Will follow up with results and treatment plan.     Moriah Bolton DO

## 2023-07-15 ENCOUNTER — APPOINTMENT (OUTPATIENT)
Dept: GENERAL RADIOLOGY | Age: 68
End: 2023-07-15
Payer: COMMERCIAL

## 2023-07-15 ENCOUNTER — HOSPITAL ENCOUNTER (EMERGENCY)
Age: 68
Discharge: HOME | End: 2023-07-15
Attending: EMERGENCY MEDICINE
Payer: COMMERCIAL

## 2023-07-15 ENCOUNTER — APPOINTMENT (OUTPATIENT)
Dept: CT IMAGING | Age: 68
End: 2023-07-15
Payer: COMMERCIAL

## 2023-07-15 VITALS
TEMPERATURE: 98 F | OXYGEN SATURATION: 98 % | DIASTOLIC BLOOD PRESSURE: 80 MMHG | RESPIRATION RATE: 18 BRPM | HEART RATE: 76 BPM | SYSTOLIC BLOOD PRESSURE: 115 MMHG

## 2023-07-15 DIAGNOSIS — M25.511 BILATERAL SHOULDER PAIN, UNSPECIFIED CHRONICITY: ICD-10-CM

## 2023-07-15 DIAGNOSIS — R07.9 CHEST PAIN, UNSPECIFIED TYPE: Primary | ICD-10-CM

## 2023-07-15 DIAGNOSIS — M25.512 BILATERAL SHOULDER PAIN, UNSPECIFIED CHRONICITY: ICD-10-CM

## 2023-07-15 LAB
ALBUMIN SERPL-MCNC: 3.9 G/DL (ref 3.4–5)
ALBUMIN/GLOB SERPL: 1.3 {RATIO} (ref 1.1–2.2)
ALP SERPL-CCNC: 75 U/L (ref 40–129)
ALT SERPL-CCNC: 12 U/L (ref 10–40)
ANION GAP SERPL CALCULATED.3IONS-SCNC: 9 MMOL/L (ref 3–16)
AST SERPL-CCNC: 12 U/L (ref 15–37)
BASOPHILS # BLD: 0 K/UL (ref 0–0.2)
BASOPHILS NFR BLD: 0.8 %
BILIRUB SERPL-MCNC: <0.2 MG/DL (ref 0–1)
BUN SERPL-MCNC: 12 MG/DL (ref 7–20)
CALCIUM SERPL-MCNC: 9.4 MG/DL (ref 8.3–10.6)
CHLORIDE SERPL-SCNC: 107 MMOL/L (ref 99–110)
CO2 SERPL-SCNC: 27 MMOL/L (ref 21–32)
CREAT SERPL-MCNC: 0.7 MG/DL (ref 0.6–1.2)
DEPRECATED RDW RBC AUTO: 13.6 % (ref 12.4–15.4)
EOSINOPHIL # BLD: 0.1 K/UL (ref 0–0.6)
EOSINOPHIL NFR BLD: 1.4 %
GFR SERPLBLD CREATININE-BSD FMLA CKD-EPI: >60 ML/MIN/{1.73_M2}
GLUCOSE SERPL-MCNC: 99 MG/DL (ref 70–99)
HCT VFR BLD AUTO: 38.8 % (ref 36–48)
HGB BLD-MCNC: 13 G/DL (ref 12–16)
LYMPHOCYTES # BLD: 1.3 K/UL (ref 1–5.1)
LYMPHOCYTES NFR BLD: 21.3 %
MCH RBC QN AUTO: 30.8 PG (ref 26–34)
MCHC RBC AUTO-ENTMCNC: 33.6 G/DL (ref 31–36)
MCV RBC AUTO: 91.7 FL (ref 80–100)
MONOCYTES # BLD: 0.5 K/UL (ref 0–1.3)
MONOCYTES NFR BLD: 7.5 %
NEUTROPHILS # BLD: 4.2 K/UL (ref 1.7–7.7)
NEUTROPHILS NFR BLD: 69 %
PLATELET # BLD AUTO: 213 K/UL (ref 135–450)
PMV BLD AUTO: 8.9 FL (ref 5–10.5)
POTASSIUM SERPL-SCNC: 4.6 MMOL/L (ref 3.5–5.1)
PROT SERPL-MCNC: 6.9 G/DL (ref 6.4–8.2)
RBC # BLD AUTO: 4.23 M/UL (ref 4–5.2)
SODIUM SERPL-SCNC: 143 MMOL/L (ref 136–145)
TROPONIN, HIGH SENSITIVITY: 16 NG/L (ref 0–14)
TROPONIN, HIGH SENSITIVITY: 16 NG/L (ref 0–14)
WBC # BLD AUTO: 6.1 K/UL (ref 4–11)

## 2023-07-15 PROCEDURE — 71260 CT THORAX DX C+: CPT

## 2023-07-15 PROCEDURE — 80053 COMPREHEN METABOLIC PANEL: CPT

## 2023-07-15 PROCEDURE — 6360000004 HC RX CONTRAST MEDICATION: Performed by: NURSE PRACTITIONER

## 2023-07-15 PROCEDURE — 99285 EMERGENCY DEPT VISIT HI MDM: CPT

## 2023-07-15 PROCEDURE — 6370000000 HC RX 637 (ALT 250 FOR IP): Performed by: NURSE PRACTITIONER

## 2023-07-15 PROCEDURE — 85025 COMPLETE CBC W/AUTO DIFF WBC: CPT

## 2023-07-15 PROCEDURE — 93005 ELECTROCARDIOGRAM TRACING: CPT | Performed by: NURSE PRACTITIONER

## 2023-07-15 PROCEDURE — 71046 X-RAY EXAM CHEST 2 VIEWS: CPT

## 2023-07-15 PROCEDURE — 84484 ASSAY OF TROPONIN QUANT: CPT

## 2023-07-15 RX ORDER — METHOCARBAMOL 750 MG/1
750 TABLET, FILM COATED ORAL ONCE
Status: COMPLETED | OUTPATIENT
Start: 2023-07-15 | End: 2023-07-15

## 2023-07-15 RX ADMIN — METHOCARBAMOL 750 MG: 750 TABLET ORAL at 15:34

## 2023-07-15 RX ADMIN — IOPAMIDOL 75 ML: 755 INJECTION, SOLUTION INTRAVENOUS at 16:23

## 2023-07-15 ASSESSMENT — ENCOUNTER SYMPTOMS
SHORTNESS OF BREATH: 0
COUGH: 0
NAUSEA: 0
APNEA: 0
COLOR CHANGE: 0
FACIAL SWELLING: 0
CHOKING: 0
BACK PAIN: 0
ABDOMINAL PAIN: 0
EYE REDNESS: 0
VOMITING: 0
EYE DISCHARGE: 0

## 2023-07-15 ASSESSMENT — PAIN DESCRIPTION - ORIENTATION: ORIENTATION: RIGHT;LEFT

## 2023-07-15 ASSESSMENT — PAIN SCALES - GENERAL
PAINLEVEL_OUTOF10: 8
PAINLEVEL_OUTOF10: 6

## 2023-07-15 ASSESSMENT — PAIN - FUNCTIONAL ASSESSMENT: PAIN_FUNCTIONAL_ASSESSMENT: 0-10

## 2023-07-15 ASSESSMENT — PAIN DESCRIPTION - LOCATION: LOCATION: SHOULDER

## 2023-07-15 NOTE — ED NOTES
Writer reviewed discharge instructions, medications, and follow-up with orthopedic and cardiologist; patient verbalized understanding. Patient's IV removed with no complications with dressing in place. Patient stable, in wheelchair, GCS 15, no signs/ symptoms of acute distress, respirations unlabored, and with spouse.       Seema Sewell RN  07/15/23 1089

## 2023-07-16 LAB
EKG ATRIAL RATE: 71 BPM
EKG DIAGNOSIS: NORMAL
EKG P AXIS: 10 DEGREES
EKG P-R INTERVAL: 134 MS
EKG Q-T INTERVAL: 424 MS
EKG QRS DURATION: 80 MS
EKG QTC CALCULATION (BAZETT): 460 MS
EKG R AXIS: -18 DEGREES
EKG T AXIS: -6 DEGREES
EKG VENTRICULAR RATE: 71 BPM

## 2023-07-16 PROCEDURE — 93010 ELECTROCARDIOGRAM REPORT: CPT | Performed by: INTERNAL MEDICINE

## 2023-09-27 NOTE — PROGRESS NOTES
On dialysis.      Drug use: No    Sexual activity: Yes     Partners: Male      Comment: 2 cups /day     Other Topics Concern    Not on file     Social History Narrative    No narrative on file     Family History   Problem Relation Age of Onset    Cancer Father     Heart Disease Sister     Other Sister         Pulmonary Embolism         Review of Systems   Constitutional: Negative. HENT: Negative. Eyes: Negative. Respiratory: Negative. Cardiovascular: Negative. Gastrointestinal: Negative. Endocrine: Negative. Genitourinary: Negative. Musculoskeletal: Positive for joint swelling and neck pain. Skin: Negative. Allergic/Immunologic: Negative. Neurological: Positive for numbness. Hematological: Bruises/bleeds easily. Psychiatric/Behavioral: Negative. Objective:   Physical Exam   Constitutional: She appears well-developed and well-nourished. HENT:   Head: Normocephalic and atraumatic. Eyes: Conjunctivae are normal.   Neck: Normal range of motion. Neck supple. Cardiovascular: Normal rate, regular rhythm, normal heart sounds and intact distal pulses. Pulmonary/Chest: Effort normal and breath sounds normal.   Abdominal: Soft. Bowel sounds are normal. She exhibits no mass. Musculoskeletal: She exhibits no edema. Skin: Skin is warm and dry. No palpable cords or tenderness R calf. No erythema  Nursing note and vitals reviewed. 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 Agreeable. ADDENDUM: Venous scan negative for DVT/SVT or chronic changes. Reasssured pt. Will continue TH stockings. Advised against further asymptomatic treatment at this time. Advised to f/u prn with me or if she becomes symptomatic.     Also

## 2023-10-17 NOTE — PROGRESS NOTES
10/1/2021    This is a 72 y.o. female who presents for  Chief Complaint   Patient presents with    Gynecologic Exam    Discuss Medications     patient would like to know if her gabapentin can be increased. HPI:     No LMP recorded (lmp unknown).  Patient is postmenopausal.  Denies any vaginal discharge, new pelvic pain, dyspareunia, AUB  No new sexual partners, one active partner   Defers any STD testing    Breast exam:   Denies any new lumps or bumps, skin changes, nipple drainage, new tenderness  Family history updated  Still defers mammography     Past Medical History:   Diagnosis Date    Allergic rhinitis     Arthritis     Dizziness     Hip pain     Hx of blood clots 2012    PE    Hypertension     Does not currently take any medications    IBS (irritable bowel syndrome)     Neuropathy     Pulmonary embolism (HCC)     Recurrent low back pain     Thyroid disease     enlarged    Tinnitus        Past Surgical History:   Procedure Laterality Date    COLONOSCOPY  10/26/2016    Polyps    FOOT SURGERY Left     THYROIDECTOMY Left 8/3/2020    LEFT THYROID LOBECTOMY WITH NERVE MONITORING  CPT CODE - 04638 performed by Brennen Romero DO at [address] [place] [address], PARTIAL Left 08/2020    TONSILLECTOMY         Social History     Socioeconomic History    Marital status:      Spouse name: Not on file    Number of children: 2    Years of education: Not on file    Highest education level: Not on file   Occupational History    Not on file   Tobacco Use    Smoking status: Never Smoker    Smokeless tobacco: Never Used   Vaping Use    Vaping Use: Never used   Substance and Sexual Activity    Alcohol use: No    Drug use: No    Sexual activity: Yes     Partners: Male     Comment: 2 cups /day   Other Topics Concern    Not on file   Social History Narrative    Not on file     Social Determinants of Health     Financial Resource Strain: Low Risk     Difficulty of Paying Living Expenses: Not [facility]  [address] [facility] [place] [address]  Dept: [phone]  Dept Fax: [phone]    Behavioral Health Services Progress Note      Patient:  Geneva Don    :  1974    Date of Service:  10/16/2023    The encounter diagnosis was Posttraumatic stress disorder.    53 minutes were spent with the patient in a video encounter.    This visit is being performed virtually via Video visit using [employer].   Clinical Location: [facility]  Geneva's location Home and is physically present in   the [facility] at the time of this visit.     Data and Progress toward goal(s) and objective(s):  Pt participated in practicing 4 stabilization breathing techniques and developing a calm place.  Pt agreed to practicing coping strategies regularly. No report of SI, HI, NSSI.       Intervention:  Other (describe):  EMDR     Patient continues to be involved in service planning:  YES    Describe above interventions:  Clinician re-introduced pt to the EMDR process. Led pt through 4 stabilization breathing techniques. Helped pt establish and enhance a calm place through BLS.  Pt was encouraged to practice coping strategies regularly and often.     Patient's response to interventions: Pt was engaged throughout the session. Expressed feeling relief and relaxed feelings during this session.      Continue to support patient's efforts and progress towards established treatment plan goals in the following ways:  Provide weekly EMDR therapy to process and help resolve PTSD-related symptoms.     Arely Cullen PSYD   hard at all   Food Insecurity: No Food Insecurity    Worried About [facility] in the Last Year: Never true    Mary of Food in the Last Year: Never true   Transportation Needs: No Transportation Needs    Lack of Transportation (Medical): No    Lack of Transportation (Non-Medical):  No   Physical Activity:     Days of Exercise per Week:     Minutes of Exercise per Session:    Stress:     Feeling of Stress :    Social Connections:     Frequency of Communication with Friends and Family:     Frequency of Social Gatherings with Friends and Family:     Attends Faith Services:     Active Member of Clubs or Organizations:     Attends Club or Organization Meetings:     Marital Status:    Intimate Partner Violence:     Fear of Current or Ex-Partner:     Emotionally Abused:     Physically Abused:     Sexually Abused:        Family History   Problem Relation Age of Onset    Cancer Father     Heart Disease Sister     Other Sister         Pulmonary Embolism       Current Outpatient Medications   Medication Sig Dispense Refill    gabapentin (NEURONTIN) 100 MG capsule Take 1 tablet by mouth every morning and at lunch, with the 400 mg tablet 60 capsule 1    gabapentin (NEURONTIN) 400 MG capsule TAKE 1 CAPSULE WITH        BREAKFAST AND LUNCH 180 capsule 1    gabapentin (NEURONTIN) 300 MG capsule TAKE 2 CAPSULES IN THE     EVENING AND 2 CAPSULES     BEFORE BEDTIME 360 capsule 1    aspirin 81 MG chewable tablet Take 81 mg by mouth daily      diclofenac sodium (VOLTAREN) 1 % GEL Apply 2-4 g topically 4 times daily as needed for Pain 100 g 1    Glucosamine-Chondroit-Vit C-Mn (GLUCOSAMINE 1500 COMPLEX) CAPS Take by mouth      melatonin 3 MG TABS tablet Take 10 mg by mouth daily Take one half tablet nightly       magnesium (MAGNESIUM-OXIDE) 250 MG TABS tablet Take 250 mg by mouth daily      Calcium Carbonate-Vit D-Min 8175-8621 MG-UNIT CHEW Take 600 mg by mouth daily        No current facility-administered medications for this visit. Immunization History   Administered Date(s) Administered    Hepatitis A Adult (Vaqta) 10/31/2018    Influenza Vaccine, unspecified formulation 10/10/2016    Influenza, Quadv, IM, PF (6 mo and older Fluzone, Flulaval, Fluarix, and 3 yrs and older Afluria) 10/01/2018, 10/11/2019    Tdap (Boostrix, Adacel) 10/20/2011    Zoster Live (Zostavax) 11/29/2013    Zoster Recombinant (Shingrix) 09/13/2019, 12/27/2019       Allergies   Allergen Reactions    Penicillins Hives       Review of Systems   Constitutional: Negative for activity change, chills, fever and unexpected weight change. Respiratory: Negative for shortness of breath. Cardiovascular: Negative for chest pain. Gastrointestinal: Negative for abdominal distention, abdominal pain, constipation, diarrhea, nausea and vomiting. Genitourinary: Negative for decreased urine volume, difficulty urinating, dyspareunia, dysuria, flank pain, frequency, genital sores, hematuria, menstrual problem, pelvic pain, urgency, vaginal bleeding, vaginal discharge and vaginal pain. Musculoskeletal: Positive for arthralgias, gait problem, joint swelling and myalgias. Negative for back pain. Skin: Negative for color change and rash. Allergic/Immunologic: Negative for immunocompromised state. Hematological: Negative for adenopathy. Psychiatric/Behavioral: Positive for sleep disturbance. BP (!) 144/88 (Site: Left Upper Arm, Position: Sitting, Cuff Size: Medium Adult)   Pulse 89   Wt 249 lb 12.8 oz (113.3 kg)   LMP  (LMP Unknown)   SpO2 98%   BMI 35.84 kg/m²     Physical Exam  Vitals and nursing note reviewed. Exam conducted with a chaperone present. Constitutional:       General: She is not in acute distress. Appearance: She is well-developed. She is not diaphoretic. HENT:      Head: Normocephalic and atraumatic. Eyes:      Pupils: Pupils are equal, round, and reactive to light. Cardiovascular:      Rate and Rhythm: Normal rate and regular rhythm. Pulmonary:      Effort: Pulmonary effort is normal. No respiratory distress. Breath sounds: Normal breath sounds. Abdominal:      General: There is no distension. Palpations: Abdomen is soft. There is no mass. Tenderness: There is no abdominal tenderness. There is no guarding or rebound. Genitourinary:     Labia:         Right: No rash, tenderness, lesion or injury. Left: No rash, tenderness, lesion or injury. Vagina: Normal. No signs of injury and foreign body. No vaginal discharge, erythema, tenderness or bleeding. Cervix: Discharge, lesion and erythema present. No cervical motion tenderness or friability. Uterus: Not deviated, not enlarged, not fixed and not tender. Adnexa:         Right: No mass, tenderness or fullness. Left: No mass, tenderness or fullness. Comments: Blue = 6 mm polyp, no erythema or ulceration  Red = area of likely ulceration (second sample taken from this area)  Musculoskeletal:         General: Normal range of motion. Cervical back: Normal range of motion and neck supple. Skin:     General: Skin is warm and dry. Coloration: Skin is not pale. Findings: No erythema or rash. Neurological:      Mental Status: She is alert. Psychiatric:         Behavior: Behavior normal.         Thought Content: Thought content normal.         Judgment: Judgment normal.         Plan  1. Cervical cancer screening  - PAP SMEAR  - HUMAN PAPILLOMAVIRUS (HPV) DNA PROBE THIN PREP HIGH RISK    2. Encounter for screening for human papillomavirus (HPV)  - HUMAN PAPILLOMAVIRUS (HPV) DNA PROBE THIN PREP HIGH RISK    3. Encounter for screening mammogram for malignant neoplasm of breast  Still defers  Breast exam reassuring, monitor R upper medial area    4. Morning stiffness of joints  - JIGNESH; Future  - Sedimentation Rate; Future  - C-Reactive Protein;  Future  - RHEUMATOID FACTOR; Future    Inc Gabapentin to 500 mg AM and lunch (400 mg tab + 100 mg tab)  mg dinner and evening (2 300 mg tablets). While assessing care for this patient, I have reviewed all pertinent lab work/imaging/ specialist notes and care in reference to those problems addressed above in detail. Appropriate medical decision making was based on this. Please note that portions of this note may have been completed with a voice recognition program. Efforts were made to edit the dictations but occasionally words are mis-transcribed. Return if symptoms worsen or fail to improve.

## 2023-10-26 ENCOUNTER — COMMUNITY OUTREACH (OUTPATIENT)
Dept: FAMILY MEDICINE CLINIC | Age: 68
End: 2023-10-26

## 2024-03-13 ENCOUNTER — OFFICE VISIT (OUTPATIENT)
Dept: FAMILY MEDICINE CLINIC | Age: 69
End: 2024-03-13
Payer: COMMERCIAL

## 2024-03-13 VITALS
WEIGHT: 221 LBS | HEIGHT: 68 IN | BODY MASS INDEX: 33.49 KG/M2 | SYSTOLIC BLOOD PRESSURE: 156 MMHG | DIASTOLIC BLOOD PRESSURE: 98 MMHG | OXYGEN SATURATION: 99 % | HEART RATE: 77 BPM

## 2024-03-13 DIAGNOSIS — E78.2 MIXED HYPERLIPIDEMIA: ICD-10-CM

## 2024-03-13 DIAGNOSIS — G62.9 POLYNEUROPATHY: Primary | ICD-10-CM

## 2024-03-13 DIAGNOSIS — E55.9 VITAMIN D DEFICIENCY: ICD-10-CM

## 2024-03-13 DIAGNOSIS — I10 ESSENTIAL HYPERTENSION: ICD-10-CM

## 2024-03-13 PROCEDURE — 3077F SYST BP >= 140 MM HG: CPT | Performed by: STUDENT IN AN ORGANIZED HEALTH CARE EDUCATION/TRAINING PROGRAM

## 2024-03-13 PROCEDURE — 3080F DIAST BP >= 90 MM HG: CPT | Performed by: STUDENT IN AN ORGANIZED HEALTH CARE EDUCATION/TRAINING PROGRAM

## 2024-03-13 PROCEDURE — 99214 OFFICE O/P EST MOD 30 MIN: CPT | Performed by: STUDENT IN AN ORGANIZED HEALTH CARE EDUCATION/TRAINING PROGRAM

## 2024-03-13 PROCEDURE — 1123F ACP DISCUSS/DSCN MKR DOCD: CPT | Performed by: STUDENT IN AN ORGANIZED HEALTH CARE EDUCATION/TRAINING PROGRAM

## 2024-03-13 RX ORDER — GABAPENTIN 400 MG/1
CAPSULE ORAL
Qty: 450 CAPSULE | Refills: 0 | Status: SHIPPED | OUTPATIENT
Start: 2024-03-13 | End: 2025-03-14

## 2024-03-13 RX ORDER — GABAPENTIN 100 MG/1
100 CAPSULE ORAL DAILY
Qty: 90 CAPSULE | Refills: 0 | Status: SHIPPED | OUTPATIENT
Start: 2024-03-13 | End: 2024-06-11

## 2024-03-13 SDOH — ECONOMIC STABILITY: FOOD INSECURITY: WITHIN THE PAST 12 MONTHS, YOU WORRIED THAT YOUR FOOD WOULD RUN OUT BEFORE YOU GOT MONEY TO BUY MORE.: NEVER TRUE

## 2024-03-13 SDOH — ECONOMIC STABILITY: INCOME INSECURITY: HOW HARD IS IT FOR YOU TO PAY FOR THE VERY BASICS LIKE FOOD, HOUSING, MEDICAL CARE, AND HEATING?: NOT HARD AT ALL

## 2024-03-13 SDOH — ECONOMIC STABILITY: FOOD INSECURITY: WITHIN THE PAST 12 MONTHS, THE FOOD YOU BOUGHT JUST DIDN'T LAST AND YOU DIDN'T HAVE MONEY TO GET MORE.: NEVER TRUE

## 2024-03-13 ASSESSMENT — ENCOUNTER SYMPTOMS
SHORTNESS OF BREATH: 0
BLOOD IN STOOL: 0
RHINORRHEA: 0
COUGH: 0

## 2024-03-13 NOTE — PROGRESS NOTES
[facility]  3/13/2024    Shilpa Apple (:  1955) is a 68 y.o. female, here for evaluation of the following medical concerns:    Chief Complaint   Patient presents with    Follow-up        ASSESSMENT/ PLAN  1. Polyneuropathy  -Continue gabapentin as it is alleviating  - gabapentin (NEURONTIN) 100 MG capsule; Take 1 capsule by mouth daily for 90 days. Pt taking 400 mg and 100 mg total  Dispense: 90 capsule; Refill: 0  - gabapentin (NEURONTIN) 400 MG capsule; Take 400 mg at breakfast, 400 mg at lunch, 400 mg at dinner, and 800 mg at bedtime.  Dispense: 450 capsule; Refill: 0    2. Vitamin D deficiency  -Continue vitamin D and recheck at follow-up    3. Mixed hyperlipidemia  -Recheck lipid panel at follow-up appointment when fasting.    4. Essential hypertension  -Follow-up in 1 week for nurse visit blood pressure check.  Bring home blood pressure cuff with you for calibration purposes.  She states blood pressure has been running 120s to 130s over 70s at home.  Target heart rate should be between  for age for aerobic activity.       No follow-ups on file.    HPI  Patient is here for follow-up on polyneuropathy.  Gabapentin has been alleviating.  She is taking 20,000 IU vitamin D weekly.  She is not fasting today and is not taking cholesterol medication but is taking omega fatty acids.  She has been checking her blood pressures at home and it has been ranging somewhere 120s to 130s over 78.  She denies any chest pain, headaches, shortness of breath or other symptoms.  She has not been sick recently.  She denies any other medical concerns currently.  She would like to know what her exercise target heart rate should be.    ROS  Review of Systems   Constitutional:  Negative for chills and fever.   HENT:  Negative for rhinorrhea.    Respiratory:  Negative for cough and shortness of breath.    Cardiovascular:  Negative for chest pain.   Gastrointestinal:  Negative for blood in stool.

## 2024-03-19 ENCOUNTER — NURSE ONLY (OUTPATIENT)
Dept: FAMILY MEDICINE CLINIC | Age: 69
End: 2024-03-19

## 2024-03-19 VITALS — SYSTOLIC BLOOD PRESSURE: 148 MMHG | DIASTOLIC BLOOD PRESSURE: 79 MMHG

## 2024-03-20 ENCOUNTER — TELEPHONE (OUTPATIENT)
Dept: FAMILY MEDICINE CLINIC | Age: 69
End: 2024-03-20

## 2024-03-20 DIAGNOSIS — I10 ESSENTIAL HYPERTENSION: Primary | ICD-10-CM

## 2024-03-20 RX ORDER — LOSARTAN POTASSIUM 50 MG/1
50 TABLET ORAL DAILY
Qty: 30 TABLET | Refills: 0 | Status: SHIPPED | OUTPATIENT
Start: 2024-03-20

## 2024-04-03 ENCOUNTER — HOSPITAL ENCOUNTER (OUTPATIENT)
Dept: WOMENS IMAGING | Age: 69
Discharge: HOME | End: 2024-04-03
Payer: COMMERCIAL

## 2024-04-03 VITALS — HEIGHT: 70 IN | BODY MASS INDEX: 32.93 KG/M2 | WEIGHT: 230 LBS

## 2024-04-03 DIAGNOSIS — Z12.31 ENCOUNTER FOR SCREENING MAMMOGRAM FOR MALIGNANT NEOPLASM OF BREAST: ICD-10-CM

## 2024-04-03 PROCEDURE — 77063 BREAST TOMOSYNTHESIS BI: CPT

## 2024-04-05 DIAGNOSIS — R92.8 ABNORMAL MAMMOGRAM OF BOTH BREASTS: Primary | ICD-10-CM

## 2024-04-14 DIAGNOSIS — I10 ESSENTIAL HYPERTENSION: ICD-10-CM

## 2024-04-15 RX ORDER — LOSARTAN POTASSIUM 50 MG/1
50 TABLET ORAL DAILY
Qty: 30 TABLET | Refills: 0 | Status: SHIPPED | OUTPATIENT
Start: 2024-04-15

## 2024-04-15 NOTE — TELEPHONE ENCOUNTER
Refill Request     Last Seen: Last Seen Department: 3/13/2024  Last Seen by PCP: 3/13/2024    Last Written: 3/20/24      Next Appointment:   Future Appointments   Date Time Provider Department Center   4/19/2024  1:00 PM [facility] MAMMO RM 1 [facility] Rad   4/19/2024  1:40 PM ULTRASOUND BREAST [facility] Rad   4/29/2024  9:00 AM Galindo Aranda DO [place] [facility]   9/4/2024 10:30 AM SCHEDULE, MHCX [facility] AWV LPN [facility]           Requested Prescriptions     Pending Prescriptions Disp Refills    losartan (COZAAR) 50 MG tablet [Pharmacy Med Name: LOSARTAN TAB 50MG] 30 tablet 0     Sig: TAKE 1 TABLET DAILY

## 2024-04-19 ENCOUNTER — HOSPITAL ENCOUNTER (OUTPATIENT)
Dept: WOMENS IMAGING | Age: 69
Discharge: HOME | End: 2024-04-19
Payer: COMMERCIAL

## 2024-04-19 ENCOUNTER — TELEPHONE (OUTPATIENT)
Dept: FAMILY MEDICINE CLINIC | Age: 69
End: 2024-04-19

## 2024-04-19 ENCOUNTER — TELEPHONE (OUTPATIENT)
Dept: WOMENS IMAGING | Age: 69
End: 2024-04-19

## 2024-04-19 DIAGNOSIS — R92.8 ABNORMAL MAMMOGRAM: ICD-10-CM

## 2024-04-19 DIAGNOSIS — R92.8 ABNORMAL MAMMOGRAM OF BOTH BREASTS: Primary | ICD-10-CM

## 2024-04-19 DIAGNOSIS — R92.8 ABNORMAL MAMMOGRAM OF BOTH BREASTS: ICD-10-CM

## 2024-04-19 DIAGNOSIS — N63.0 SUBCUTANEOUS NODULE OF BREAST: ICD-10-CM

## 2024-04-19 PROCEDURE — 76642 ULTRASOUND BREAST LIMITED: CPT

## 2024-04-19 PROCEDURE — G0279 TOMOSYNTHESIS, MAMMO: HCPCS

## 2024-04-22 ENCOUNTER — TELEPHONE (OUTPATIENT)
Dept: WOMENS IMAGING | Age: 69
End: 2024-04-22

## 2024-04-22 NOTE — TELEPHONE ENCOUNTER
[facility]   [address], [address]  [place], [place] [address]   Phone: [phone]         ULTRASOUND BIOPSY EDUCATION    NAME:  Shilpa Apple   YOB: 1955   MEDICAL RECORD NUMBER:  [id number]   TODAY'S DATE:  4/22/2024    Referring Physician: Dr. Galindo Aranda    Procedure: U/S Core Bx    Bilateral    Date of biopsy: 5/7/24    Patient taking blood thinners: yes - ASA, holding    Medicine allergies: yes - PCN    Special Inst[facility] lots of [facility] hardships    Biopsy order form faxed to referring MD.          What is an Ultrasound Guided Breast Biopsy?  Ultrasound guided breast biopsy is a test that uses ultrasound to find an area of your breast where a tissue sample will be taken. The sample is then looked at under a microscope to check for signs of breast cancer.     Why is it done?   An Ultrasound biopsy is usually done to check for cancer in a lump or cyst found during a mammogram or ultrasound.   Preparing for the test?     * Take your medications as prescribed    You may eat and drink fluids before the test    Take a shower the evening or morning before the biopsy.  What happens before the test?  Images are taken to find the exact site to be biopsied.    Your skin is washed with an alcohol prep.      You will be given an injection of medication to numb your breast.   What happens during the test?     Once your breast is numb, a small cut (incision) is made.     Using the imaging, the doctor will guide the needle into the biopsy area.     A sample of breast tissue is taken through the needle.     A small \"Clip\" or Marker is inserted into your breast to kimberley the biopsy site.      The needle is removed and pressure put on the needle site to stop any bleeding.     A bandage will be placed over the site.     A post mammogram picture will be taken to document the clip placement.  How long does the test take? Approximately

## 2024-04-23 ENCOUNTER — TELEPHONE (OUTPATIENT)
Dept: WOMENS IMAGING | Age: 69
End: 2024-04-23

## 2024-04-23 NOTE — TELEPHONE ENCOUNTER
Call placed to patient via staff referral regarding financial assistance.  Spoke with client regarding financial assistance options. Patient agrees to referral to [facility].  Patient aware they need to complete application for program. Link given.    For [facility] that are [facility] they should note the following information:  If, or when, you receive bills for these service dates you should notify [facility].  These bills may be from [facility], [facility], and/or [facility].     With any questions, you can reach out to [facility] directly at [phone] or myself.    WELLINGTON Capone, CN-BM  Patient Navigator  [facility]  [phone]

## 2024-04-29 ENCOUNTER — OFFICE VISIT (OUTPATIENT)
Dept: FAMILY MEDICINE CLINIC | Age: 69
End: 2024-04-29
Payer: COMMERCIAL

## 2024-04-29 VITALS
BODY MASS INDEX: 31.91 KG/M2 | DIASTOLIC BLOOD PRESSURE: 88 MMHG | OXYGEN SATURATION: 99 % | HEART RATE: 88 BPM | WEIGHT: 222.4 LBS | SYSTOLIC BLOOD PRESSURE: 138 MMHG

## 2024-04-29 DIAGNOSIS — I10 ESSENTIAL HYPERTENSION: Primary | ICD-10-CM

## 2024-04-29 DIAGNOSIS — E78.2 MIXED HYPERLIPIDEMIA: ICD-10-CM

## 2024-04-29 LAB
ALBUMIN SERPL-MCNC: 4.2 G/DL (ref 3.4–5)
ALBUMIN/GLOB SERPL: 2 {RATIO} (ref 1.1–2.2)
ALP SERPL-CCNC: 65 U/L (ref 40–129)
ALT SERPL-CCNC: 14 U/L (ref 10–40)
ANION GAP SERPL CALCULATED.3IONS-SCNC: 10 MMOL/L (ref 3–16)
AST SERPL-CCNC: 15 U/L (ref 15–37)
BASOPHILS # BLD: 0.1 K/UL (ref 0–0.2)
BASOPHILS NFR BLD: 1.2 %
BILIRUB SERPL-MCNC: 0.3 MG/DL (ref 0–1)
BUN SERPL-MCNC: 10 MG/DL (ref 7–20)
CALCIUM SERPL-MCNC: 9.3 MG/DL (ref 8.3–10.6)
CHLORIDE SERPL-SCNC: 104 MMOL/L (ref 99–110)
CHOLEST SERPL-MCNC: 189 MG/DL (ref 0–199)
CO2 SERPL-SCNC: 26 MMOL/L (ref 21–32)
CREAT SERPL-MCNC: 0.6 MG/DL (ref 0.6–1.2)
DEPRECATED RDW RBC AUTO: 13.7 % (ref 12.4–15.4)
EOSINOPHIL # BLD: 0.1 K/UL (ref 0–0.6)
EOSINOPHIL NFR BLD: 1.7 %
GFR SERPLBLD CREATININE-BSD FMLA CKD-EPI: >90 ML/MIN/{1.73_M2}
GLUCOSE SERPL-MCNC: 96 MG/DL (ref 70–99)
HCT VFR BLD AUTO: 38.9 % (ref 36–48)
HDLC SERPL-MCNC: 58 MG/DL (ref 40–60)
HGB BLD-MCNC: 13.2 G/DL (ref 12–16)
LDLC SERPL CALC-MCNC: 108 MG/DL
LYMPHOCYTES # BLD: 1.6 K/UL (ref 1–5.1)
LYMPHOCYTES NFR BLD: 28.5 %
MCH RBC QN AUTO: 31.2 PG (ref 26–34)
MCHC RBC AUTO-ENTMCNC: 34 G/DL (ref 31–36)
MCV RBC AUTO: 91.8 FL (ref 80–100)
MONOCYTES # BLD: 0.3 K/UL (ref 0–1.3)
MONOCYTES NFR BLD: 5.7 %
NEUTROPHILS # BLD: 3.5 K/UL (ref 1.7–7.7)
NEUTROPHILS NFR BLD: 62.9 %
PLATELET # BLD AUTO: 193 K/UL (ref 135–450)
PMV BLD AUTO: 9.1 FL (ref 5–10.5)
POTASSIUM SERPL-SCNC: 4.6 MMOL/L (ref 3.5–5.1)
PROT SERPL-MCNC: 6.3 G/DL (ref 6.4–8.2)
RBC # BLD AUTO: 4.24 M/UL (ref 4–5.2)
SODIUM SERPL-SCNC: 140 MMOL/L (ref 136–145)
TRIGL SERPL-MCNC: 116 MG/DL (ref 0–150)
VLDLC SERPL CALC-MCNC: 23 MG/DL
WBC # BLD AUTO: 5.6 K/UL (ref 4–11)

## 2024-04-29 PROCEDURE — 1123F ACP DISCUSS/DSCN MKR DOCD: CPT | Performed by: STUDENT IN AN ORGANIZED HEALTH CARE EDUCATION/TRAINING PROGRAM

## 2024-04-29 PROCEDURE — 3079F DIAST BP 80-89 MM HG: CPT | Performed by: STUDENT IN AN ORGANIZED HEALTH CARE EDUCATION/TRAINING PROGRAM

## 2024-04-29 PROCEDURE — 99214 OFFICE O/P EST MOD 30 MIN: CPT | Performed by: STUDENT IN AN ORGANIZED HEALTH CARE EDUCATION/TRAINING PROGRAM

## 2024-04-29 PROCEDURE — 3075F SYST BP GE 130 - 139MM HG: CPT | Performed by: STUDENT IN AN ORGANIZED HEALTH CARE EDUCATION/TRAINING PROGRAM

## 2024-04-29 PROCEDURE — 36415 COLL VENOUS BLD VENIPUNCTURE: CPT | Performed by: STUDENT IN AN ORGANIZED HEALTH CARE EDUCATION/TRAINING PROGRAM

## 2024-04-29 RX ORDER — LOSARTAN POTASSIUM 50 MG/1
50 TABLET ORAL DAILY
Qty: 90 TABLET | Refills: 1 | Status: SHIPPED | OUTPATIENT
Start: 2024-04-29

## 2024-04-29 ASSESSMENT — PATIENT HEALTH QUESTIONNAIRE - PHQ9
2. FEELING DOWN, DEPRESSED OR HOPELESS: NOT AT ALL
SUM OF ALL RESPONSES TO PHQ9 QUESTIONS 1 & 2: 0
SUM OF ALL RESPONSES TO PHQ QUESTIONS 1-9: 0
SUM OF ALL RESPONSES TO PHQ QUESTIONS 1-9: 0
1. LITTLE INTEREST OR PLEASURE IN DOING THINGS: NOT AT ALL
SUM OF ALL RESPONSES TO PHQ QUESTIONS 1-9: 0
SUM OF ALL RESPONSES TO PHQ QUESTIONS 1-9: 0

## 2024-04-29 ASSESSMENT — ENCOUNTER SYMPTOMS
COUGH: 0
SHORTNESS OF BREATH: 0
RHINORRHEA: 1

## 2024-04-29 NOTE — PROGRESS NOTES
[facility]  2024    Shilpa Apple (:  1955) is a 68 y.o. female, here for evaluation of the following medical concerns:    Chief Complaint   Patient presents with    1 Month Follow-Up        ASSESSMENT/ PLAN  1. Essential hypertension  -Continue losartan.  Blood pressure 138/88 today which is within goal for treatment.  - CBC with Auto Differential; Future  - Comprehensive Metabolic Panel; Future  - LIPID PANEL; Future  - CBC with Auto Differential  - Comprehensive Metabolic Panel  - LIPID PANEL    2. Mixed hyperlipidemia  -Patient is fasting today.  Will recheck lipid panel.  - CBC with Auto Differential; Future  - Comprehensive Metabolic Panel; Future  - LIPID PANEL; Future  - CBC with Auto Differential  - Comprehensive Metabolic Panel  - LIPID PANEL       No follow-ups on file.    HPI  Patient is here for follow-up on blood pressure.  She has had a mild runny nose but it has not been too bothersome.  When her allergy symptoms get really bad she will take a Benadryl here and there.  She does not feel it is bothersome enough to take a nose spray.  Symptoms have pretty much resolved for the year.  Her allergies bother her most when the tree started to bloom at the beginning of spring.  She is taking the losartan without any side effects.  Her blood pressure has been improved at home for the most part.  Her blood pressure cuff did read about 6 points higher than ours did when she was here.  This morning her home cuff send 148 systolic.  It was 138.  She did take her losartan in between the 2 readings.    ROS  Review of Systems   Constitutional:  Negative for chills and fever.   HENT:  Positive for rhinorrhea.    Respiratory:  Negative for cough and shortness of breath.    Cardiovascular:  Negative for chest pain.       HISTORIES  Current Outpatient Medications on File Prior to Visit   Medication Sig Dispense Refill    losartan (COZAAR) 50 MG tablet TAKE 1 TABLET DAILY 30

## 2024-04-30 RX ORDER — ATORVASTATIN CALCIUM 20 MG/1
20 TABLET, FILM COATED ORAL
Qty: 90 TABLET | Refills: 0 | Status: SHIPPED | OUTPATIENT
Start: 2024-04-30

## 2024-05-07 ENCOUNTER — HOSPITAL ENCOUNTER (OUTPATIENT)
Dept: WOMENS IMAGING | Age: 69
Discharge: HOME | End: 2024-05-07
Payer: COMMERCIAL

## 2024-05-07 DIAGNOSIS — R92.8 ABNORMAL MAMMOGRAM: ICD-10-CM

## 2024-05-07 DIAGNOSIS — R92.8 ABNORMAL MAMMOGRAM OF BOTH BREASTS: ICD-10-CM

## 2024-05-07 DIAGNOSIS — N63.0 SUBCUTANEOUS NODULE OF BREAST: ICD-10-CM

## 2024-05-07 PROCEDURE — 2720000010 US BREAST BIOPSY W LOC DEVICE 1ST LESION LEFT

## 2024-05-07 PROCEDURE — 77066 DX MAMMO INCL CAD BI: CPT

## 2024-05-07 PROCEDURE — 88305 TISSUE EXAM BY PATHOLOGIST: CPT

## 2024-05-07 PROCEDURE — 19083 BX BREAST 1ST LESION US IMAG: CPT

## 2024-05-07 NOTE — PROGRESS NOTES
Patient in [facility] for breast biopsy. Radiologist reviewed procedure with patient, consent signed. Patient tolerated procedure well. Compression held. Site cleansed with chloraprep, steri strips and dry dressing applied. Ice pack provided. Reviewed discharge instructions with patient. Patient verbalized understanding and agreed to contact the Breast Navigator with any questions. Patient was A&Ox3 and steady on feet and discharged to waiting area.      [facility]   Discharge Instructions  [facility]  [address]  [address]  Telephone: [phone]   FAX [phone]    NAME:  Shilpa Apple  YOB: 1955  GENDER: female  MEDICAL RECORD NUMBER:  [id number]  TODAY'S DATE:  5/7/2024    Discharge Instructions Breast Center:    Post Breast Biopsy Instructions     [x] You may remove your outer dressing in 24 hours and you may shower. The surgical glue will fall off after 7 days. Do not pick, scratch, or rub the film.     [x] Place a cold pack inside your bra on top of the dressing for at least 3 hours, removing it every 15 minutes for 15 minutes after your biopsy.     [x] Wear a firm fitting bra for at least 24 hours after your biopsy, including while you sleep.    [x] Place an ice pack inside your bra on top of the dressing for at least 3 hours, removing it every 15 minutes for 15 minutes after your biopsy.    [x] You may resume your held medications tomorrow, unless otherwise directed by your physician.    [x] Your physician has instructed you to take Tylenol (Acetaminophen) the day of your biopsy for any discomfort.    [x] Watch for excessive bleeding. If bleeding occurs apply pressure to site. Watch for signs of infection, increased pain, redness, swelling and heat.  If this occurs call your physician.     [x] Do not participate in any strenuous exercise for 48 hours after your biopsy and do not lift, pull or push anything over 5-10 lbs.      [x]

## 2024-05-08 ENCOUNTER — TELEPHONE (OUTPATIENT)
Dept: WOMENS IMAGING | Age: 69
End: 2024-05-08

## 2024-05-24 DIAGNOSIS — G62.9 POLYNEUROPATHY: ICD-10-CM

## 2024-05-24 RX ORDER — GABAPENTIN 400 MG/1
CAPSULE ORAL
Qty: 450 CAPSULE | Refills: 0 | Status: SHIPPED | OUTPATIENT
Start: 2024-05-24 | End: 2024-08-22

## 2024-05-24 RX ORDER — GABAPENTIN 100 MG/1
CAPSULE ORAL
Qty: 90 CAPSULE | Refills: 0 | Status: SHIPPED | OUTPATIENT
Start: 2024-05-24 | End: 2024-08-22

## 2024-05-24 NOTE — TELEPHONE ENCOUNTER
Refill Request       Last Seen: Last Seen Department: 4/29/2024  Last Seen by PCP: 4/29/2024    Last Written: 03/13/2024        Next Appointment:   Future Appointments   Date Time Provider Department Center   7/29/2024  9:30 AM Galindo Aranda DO [facility] [place] - [facility]   9/4/2024 10:30 AM SCHEDULE, [facility] AWV LPN [facility] Kris - DYD   11/7/2024  1:30 PM MHC[facility] EG WC MAMMO [facility] EG WC [facility]   11/7/2024  2:00 PM MHCZ EG WC US MHCZ EG  [facility]             Requested Prescriptions     Pending Prescriptions Disp Refills    gabapentin (NEURONTIN) 400 MG capsule [Pharmacy Med Name: GABAPENTIN CAP 400MG] 450 capsule 0     Sig: TAKE 1 CAPSULE AT BREAKFAST, 1 CAPSULE AT LUNCH, 1 CAPSULE AT DINNER, AND 2 CAPSULES AT BEDTIME    gabapentin (NEURONTIN) 100 MG capsule [Pharmacy Med Name: GABAPENTIN CAP 100MG] 90 capsule 0     Sig: TAKE 1 CAPSULE DAILY (400MGAND 100MG TOTAL)

## 2024-07-18 RX ORDER — ATORVASTATIN CALCIUM 20 MG/1
20 TABLET, FILM COATED ORAL NIGHTLY
Qty: 90 TABLET | Refills: 0 | Status: SHIPPED | OUTPATIENT
Start: 2024-07-18

## 2024-07-18 NOTE — TELEPHONE ENCOUNTER
Refill Request     Last Seen: Last Seen Department: 4/29/2024  Last Seen by PCP: 4/29/2024      Next Appointment:   Future Appointments   Date Time Provider Department Center   7/29/2024  9:30 AM Galindo Aranda DO [facility] - DYBENJA   9/4/2024 10:30 AM SCHEDULE, [facility] AWV LPN [facility] - DYBENJA   11/7/2024  1:30 PM MHCZ EG WC MAMMO [facility] EG WC [facility]   11/7/2024  2:00 PM MHCZ EG WC US MHCZ EG [facility] Rad           Requested Prescriptions     Pending Prescriptions Disp Refills    atorvastatin (LIPITOR) 20 MG tablet [Pharmacy Med Name: ATORVASTATIN TAB 20MG] 90 tablet 0     Sig: TAKE 1 TABLET NIGHTLY

## 2024-07-29 ENCOUNTER — OFFICE VISIT (OUTPATIENT)
Dept: FAMILY MEDICINE CLINIC | Age: 69
End: 2024-07-29
Payer: COMMERCIAL

## 2024-07-29 VITALS
BODY MASS INDEX: 30.49 KG/M2 | HEIGHT: 70 IN | RESPIRATION RATE: 16 BRPM | OXYGEN SATURATION: 97 % | DIASTOLIC BLOOD PRESSURE: 80 MMHG | WEIGHT: 213 LBS | SYSTOLIC BLOOD PRESSURE: 138 MMHG | HEART RATE: 74 BPM

## 2024-07-29 DIAGNOSIS — E78.2 MIXED HYPERLIPIDEMIA: Primary | ICD-10-CM

## 2024-07-29 DIAGNOSIS — G62.9 POLYNEUROPATHY: ICD-10-CM

## 2024-07-29 DIAGNOSIS — I10 ESSENTIAL HYPERTENSION: ICD-10-CM

## 2024-07-29 DIAGNOSIS — M79.675 GREAT TOE PAIN, LEFT: ICD-10-CM

## 2024-07-29 PROCEDURE — 1123F ACP DISCUSS/DSCN MKR DOCD: CPT | Performed by: STUDENT IN AN ORGANIZED HEALTH CARE EDUCATION/TRAINING PROGRAM

## 2024-07-29 PROCEDURE — 3075F SYST BP GE 130 - 139MM HG: CPT | Performed by: STUDENT IN AN ORGANIZED HEALTH CARE EDUCATION/TRAINING PROGRAM

## 2024-07-29 PROCEDURE — 3079F DIAST BP 80-89 MM HG: CPT | Performed by: STUDENT IN AN ORGANIZED HEALTH CARE EDUCATION/TRAINING PROGRAM

## 2024-07-29 PROCEDURE — 99214 OFFICE O/P EST MOD 30 MIN: CPT | Performed by: STUDENT IN AN ORGANIZED HEALTH CARE EDUCATION/TRAINING PROGRAM

## 2024-07-29 ASSESSMENT — ENCOUNTER SYMPTOMS
RHINORRHEA: 0
SHORTNESS OF BREATH: 0
COUGH: 0

## 2024-07-29 NOTE — PROGRESS NOTES
[facility]  2024    Shilpa Apple (:  1955) is a 68 y.o. female, here for evaluation of the following medical concerns:    Chief Complaint   Patient presents with    Hypertension     Pt is here for a 3 month follow up     Toe Pain     Left great toe pain x several months         ASSESSMENT/ PLAN  1. Mixed hyperlipidemia  -Continue atorvastatin    2. Essential hypertension  -Blood pressure is 138/80 today which is within goal for treatment.  Will continue losartan    3. Polyneuropathy  -Refill gabapentin.  She denies any decrease sensation in her feet.  No history of diabetes.    4.  Left great toe pain  - Recommend wearing extra padding as pain is under the toenail at the very distal end.  There is no erythema, warmth or purulence.  No sign of gout.  No signs of infection.  Follow-up if not improving.       No follow-ups on file.    HPI  Shilpa Apple is here for follow-up on hypertension, polyneuropathy, and hyperlipidemia.  Gabapentin has been alleviating for her neuropathy.  She is taking her medications without side effects.  She has not been able to check her blood pressure recently as she is dealing with her  who has had a fracture of the sacrum and some vertebral compression fractures as well.  He had a CT scan of the abdomen which showed a large liver mass.  They have elected to start hospice care for him.  She has been feeling down lately but denies any SI.  She does not want to start any medication at this time for anxiety or depression as it could potentially make her a little foggy and she has to get up every couple hours to give her  pain medication.    ROS  Review of Systems   Constitutional:  Negative for chills and fever.   HENT:  Negative for rhinorrhea.    Respiratory:  Negative for cough and shortness of breath.        HISTORIES  Current Outpatient Medications on File Prior to Visit   Medication Sig Dispense Refill    atorvastatin

## 2024-08-07 DIAGNOSIS — G62.9 POLYNEUROPATHY: ICD-10-CM

## 2024-08-07 RX ORDER — GABAPENTIN 400 MG/1
CAPSULE ORAL
Qty: 450 CAPSULE | Refills: 0 | Status: SHIPPED | OUTPATIENT
Start: 2024-08-07 | End: 2024-11-05

## 2024-08-07 RX ORDER — GABAPENTIN 100 MG/1
CAPSULE ORAL
Qty: 90 CAPSULE | Refills: 0 | Status: SHIPPED | OUTPATIENT
Start: 2024-08-07 | End: 2024-11-05

## 2024-09-04 ENCOUNTER — TELEMEDICINE (OUTPATIENT)
Dept: FAMILY MEDICINE CLINIC | Age: 69
End: 2024-09-04
Payer: COMMERCIAL

## 2024-09-04 DIAGNOSIS — Z00.00 MEDICARE ANNUAL WELLNESS VISIT, SUBSEQUENT: Primary | ICD-10-CM

## 2024-09-04 PROCEDURE — G0439 PPPS, SUBSEQ VISIT: HCPCS | Performed by: STUDENT IN AN ORGANIZED HEALTH CARE EDUCATION/TRAINING PROGRAM

## 2024-09-04 PROCEDURE — 1123F ACP DISCUSS/DSCN MKR DOCD: CPT | Performed by: STUDENT IN AN ORGANIZED HEALTH CARE EDUCATION/TRAINING PROGRAM

## 2024-09-04 ASSESSMENT — PATIENT HEALTH QUESTIONNAIRE - PHQ9
SUM OF ALL RESPONSES TO PHQ QUESTIONS 1-9: 0
1. LITTLE INTEREST OR PLEASURE IN DOING THINGS: NOT AT ALL
SUM OF ALL RESPONSES TO PHQ9 QUESTIONS 1 & 2: 0
SUM OF ALL RESPONSES TO PHQ QUESTIONS 1-9: 0
2. FEELING DOWN, DEPRESSED OR HOPELESS: NOT AT ALL

## 2024-09-04 ASSESSMENT — LIFESTYLE VARIABLES
HOW OFTEN DO YOU HAVE A DRINK CONTAINING ALCOHOL: NEVER
HOW MANY STANDARD DRINKS CONTAINING ALCOHOL DO YOU HAVE ON A TYPICAL DAY: PATIENT DOES NOT DRINK

## 2024-09-04 NOTE — PROGRESS NOTES
Medicare Annual Wellness Visit    Shilpa Apple is here for Medicare AWV    Assessment & Plan   Medicare annual wellness visit, subsequent  Recommendations for Preventive Services Due: see orders and patient instructions/AVS.  Recommended screening schedule for the next 5-10 years is provided to the patient in written form: see Patient Instructions/AVS.     No follow-ups on file.     Subjective       Patient's complete Health Risk Assessment and screening values have been reviewed and are found in Flowsheets. The following problems were reviewed today and where indicated follow up appointments were made and/or referrals ordered.    Positive Risk Factor Screenings with Interventions:    Fall Risk:  Do you feel unsteady or are you worried about falling? : (!) yes (uses cane and rollator walker)  2 or more falls in past year?: no  Fall with injury in past year?: no     Interventions:    Reviewed medications, home hazards, visual acuity, and co-morbidities that can increase risk for falls  See AVS for additional education material            General HRA Questions:  Select all that apply: (!) Loneliness (recent spouse passed away)  Interventions - Loneliness:  See AVS for additional education material        Abnormal BMI (obese):  There is no height or weight on file to calculate BMI. (!) Abnormal  Interventions:  See AVS for additional education material          Vision Screen:  Do you have difficulty driving, watching TV, or doing any of your daily activities because of your eyesight?: No  Have you had an eye exam within the past year?: (!) No  Interventions:   See AVS for additional education material                    Objective    Patient-Reported Vitals  Patient-Reported Systolic (Top): 120 mmHg  Patient-Reported Diastolic (Bottom): 76 mmHg  Patient-Reported Weight: 215lb  Patient-Reported Height: 5' 10\"                 Allergies   Allergen Reactions    Penicillins Hives     Prior to Visit Medications

## 2024-10-18 DIAGNOSIS — I10 ESSENTIAL HYPERTENSION: ICD-10-CM

## 2024-10-18 DIAGNOSIS — G62.9 POLYNEUROPATHY: ICD-10-CM

## 2024-10-18 RX ORDER — GABAPENTIN 400 MG/1
CAPSULE ORAL
Qty: 180 CAPSULE | Refills: 0 | Status: SHIPPED | OUTPATIENT
Start: 2024-10-18 | End: 2025-01-16

## 2024-10-18 RX ORDER — GABAPENTIN 100 MG/1
CAPSULE ORAL
Qty: 90 CAPSULE | Refills: 0 | Status: SHIPPED | OUTPATIENT
Start: 2024-10-18 | End: 2025-01-16

## 2024-10-18 RX ORDER — LOSARTAN POTASSIUM 50 MG/1
50 TABLET ORAL DAILY
Qty: 90 TABLET | Refills: 1 | Status: SHIPPED | OUTPATIENT
Start: 2024-10-18

## 2024-10-18 RX ORDER — ATORVASTATIN CALCIUM 20 MG/1
20 TABLET, FILM COATED ORAL NIGHTLY
Qty: 90 TABLET | Refills: 0 | Status: SHIPPED | OUTPATIENT
Start: 2024-10-18

## 2024-11-07 ENCOUNTER — HOSPITAL ENCOUNTER (OUTPATIENT)
Dept: WOMENS IMAGING | Age: 69
Discharge: HOME | End: 2024-11-07
Payer: COMMERCIAL

## 2024-11-07 ENCOUNTER — HOSPITAL ENCOUNTER (OUTPATIENT)
Dept: WOMENS IMAGING | Age: 69
End: 2024-11-07
Payer: COMMERCIAL

## 2024-11-07 VITALS — BODY MASS INDEX: 30.06 KG/M2 | WEIGHT: 210 LBS | HEIGHT: 70 IN

## 2024-11-07 DIAGNOSIS — R92.8 ABNORMAL MAMMOGRAM: ICD-10-CM

## 2024-11-07 PROCEDURE — G0279 TOMOSYNTHESIS, MAMMO: HCPCS

## 2024-12-18 DIAGNOSIS — G62.9 POLYNEUROPATHY: ICD-10-CM

## 2024-12-18 RX ORDER — GABAPENTIN 400 MG/1
CAPSULE ORAL
Qty: 450 CAPSULE | Refills: 0 | Status: SHIPPED | OUTPATIENT
Start: 2024-12-18 | End: 2025-03-18

## 2024-12-18 RX ORDER — GABAPENTIN 100 MG/1
100 CAPSULE ORAL 3 TIMES DAILY
Qty: 270 CAPSULE | Refills: 0 | Status: SHIPPED | OUTPATIENT
Start: 2024-12-18 | End: 2025-03-18

## 2024-12-18 RX ORDER — ATORVASTATIN CALCIUM 20 MG/1
20 TABLET, FILM COATED ORAL NIGHTLY
Qty: 90 TABLET | Refills: 1 | Status: SHIPPED | OUTPATIENT
Start: 2024-12-18

## 2025-01-27 ASSESSMENT — PATIENT HEALTH QUESTIONNAIRE - PHQ9
SUM OF ALL RESPONSES TO PHQ QUESTIONS 1-9: 0
1. LITTLE INTEREST OR PLEASURE IN DOING THINGS: NOT AT ALL
2. FEELING DOWN, DEPRESSED OR HOPELESS: NOT AT ALL
2. FEELING DOWN, DEPRESSED OR HOPELESS: NOT AT ALL
1. LITTLE INTEREST OR PLEASURE IN DOING THINGS: NOT AT ALL
SUM OF ALL RESPONSES TO PHQ QUESTIONS 1-9: 0
SUM OF ALL RESPONSES TO PHQ9 QUESTIONS 1 & 2: 0
SUM OF ALL RESPONSES TO PHQ QUESTIONS 1-9: 0
SUM OF ALL RESPONSES TO PHQ9 QUESTIONS 1 & 2: 0
SUM OF ALL RESPONSES TO PHQ QUESTIONS 1-9: 0

## 2025-01-29 ENCOUNTER — OFFICE VISIT (OUTPATIENT)
Dept: FAMILY MEDICINE CLINIC | Age: 70
End: 2025-01-29
Payer: MEDICARE

## 2025-01-29 VITALS
OXYGEN SATURATION: 97 % | RESPIRATION RATE: 16 BRPM | SYSTOLIC BLOOD PRESSURE: 132 MMHG | WEIGHT: 217 LBS | HEIGHT: 70 IN | DIASTOLIC BLOOD PRESSURE: 72 MMHG | BODY MASS INDEX: 31.07 KG/M2 | HEART RATE: 85 BPM

## 2025-01-29 DIAGNOSIS — M11.262 CHONDROCALCINOSIS OF LEFT KNEE: ICD-10-CM

## 2025-01-29 DIAGNOSIS — I10 ESSENTIAL HYPERTENSION: ICD-10-CM

## 2025-01-29 DIAGNOSIS — E55.9 VITAMIN D DEFICIENCY: Primary | ICD-10-CM

## 2025-01-29 DIAGNOSIS — E78.2 MIXED HYPERLIPIDEMIA: ICD-10-CM

## 2025-01-29 DIAGNOSIS — M15.9 GENERALIZED OSTEOARTHRITIS: ICD-10-CM

## 2025-01-29 DIAGNOSIS — G62.9 POLYNEUROPATHY: ICD-10-CM

## 2025-01-29 LAB
25(OH)D3 SERPL-MCNC: 42.3 NG/ML
ALBUMIN SERPL-MCNC: 4 G/DL (ref 3.4–5)
ALBUMIN/GLOB SERPL: 1.7 {RATIO} (ref 1.1–2.2)
ALP SERPL-CCNC: 73 U/L (ref 40–129)
ALT SERPL-CCNC: 27 U/L (ref 10–40)
ANION GAP SERPL CALCULATED.3IONS-SCNC: 9 MMOL/L (ref 3–16)
AST SERPL-CCNC: 23 U/L (ref 15–37)
BASOPHILS # BLD: 0 K/UL (ref 0–0.2)
BASOPHILS NFR BLD: 0.1 %
BILIRUB SERPL-MCNC: 0.3 MG/DL (ref 0–1)
BUN SERPL-MCNC: 14 MG/DL (ref 7–20)
CALCIUM SERPL-MCNC: 9.3 MG/DL (ref 8.3–10.6)
CHLORIDE SERPL-SCNC: 109 MMOL/L (ref 99–110)
CO2 SERPL-SCNC: 28 MMOL/L (ref 21–32)
CREAT SERPL-MCNC: 0.7 MG/DL (ref 0.6–1.2)
DEPRECATED RDW RBC AUTO: 13.5 % (ref 12.4–15.4)
EOSINOPHIL # BLD: 0.1 K/UL (ref 0–0.6)
EOSINOPHIL NFR BLD: 2.1 %
GFR SERPLBLD CREATININE-BSD FMLA CKD-EPI: >90 ML/MIN/{1.73_M2}
GLUCOSE SERPL-MCNC: 120 MG/DL (ref 70–99)
HCT VFR BLD AUTO: 41.4 % (ref 36–48)
HGB BLD-MCNC: 13.8 G/DL (ref 12–16)
LYMPHOCYTES # BLD: 1.2 K/UL (ref 1–5.1)
LYMPHOCYTES NFR BLD: 22 %
MCH RBC QN AUTO: 31.3 PG (ref 26–34)
MCHC RBC AUTO-ENTMCNC: 33.4 G/DL (ref 31–36)
MCV RBC AUTO: 93.6 FL (ref 80–100)
MONOCYTES # BLD: 0.2 K/UL (ref 0–1.3)
MONOCYTES NFR BLD: 4.4 %
NEUTROPHILS # BLD: 4 K/UL (ref 1.7–7.7)
NEUTROPHILS NFR BLD: 71.4 %
PLATELET # BLD AUTO: 192 K/UL (ref 135–450)
PMV BLD AUTO: 9.7 FL (ref 5–10.5)
POTASSIUM SERPL-SCNC: 4.7 MMOL/L (ref 3.5–5.1)
PROT SERPL-MCNC: 6.3 G/DL (ref 6.4–8.2)
RBC # BLD AUTO: 4.42 M/UL (ref 4–5.2)
SODIUM SERPL-SCNC: 146 MMOL/L (ref 136–145)
WBC # BLD AUTO: 5.6 K/UL (ref 4–11)

## 2025-01-29 PROCEDURE — 3075F SYST BP GE 130 - 139MM HG: CPT | Performed by: STUDENT IN AN ORGANIZED HEALTH CARE EDUCATION/TRAINING PROGRAM

## 2025-01-29 PROCEDURE — 36415 COLL VENOUS BLD VENIPUNCTURE: CPT | Performed by: STUDENT IN AN ORGANIZED HEALTH CARE EDUCATION/TRAINING PROGRAM

## 2025-01-29 PROCEDURE — 3078F DIAST BP <80 MM HG: CPT | Performed by: STUDENT IN AN ORGANIZED HEALTH CARE EDUCATION/TRAINING PROGRAM

## 2025-01-29 PROCEDURE — 99214 OFFICE O/P EST MOD 30 MIN: CPT | Performed by: STUDENT IN AN ORGANIZED HEALTH CARE EDUCATION/TRAINING PROGRAM

## 2025-01-29 PROCEDURE — 1159F MED LIST DOCD IN RCRD: CPT | Performed by: STUDENT IN AN ORGANIZED HEALTH CARE EDUCATION/TRAINING PROGRAM

## 2025-01-29 PROCEDURE — 1123F ACP DISCUSS/DSCN MKR DOCD: CPT | Performed by: STUDENT IN AN ORGANIZED HEALTH CARE EDUCATION/TRAINING PROGRAM

## 2025-01-29 RX ORDER — GABAPENTIN 400 MG/1
CAPSULE ORAL
Qty: 450 CAPSULE | Refills: 1 | Status: SHIPPED | OUTPATIENT
Start: 2025-01-29 | End: 2025-04-29

## 2025-01-29 RX ORDER — LOSARTAN POTASSIUM 50 MG/1
50 TABLET ORAL DAILY
Qty: 90 TABLET | Refills: 1 | Status: SHIPPED | OUTPATIENT
Start: 2025-01-29

## 2025-01-29 RX ORDER — GABAPENTIN 100 MG/1
100 CAPSULE ORAL DAILY
Qty: 90 CAPSULE | Refills: 1
Start: 2025-01-29 | End: 2025-01-30 | Stop reason: SDUPTHER

## 2025-01-29 SDOH — ECONOMIC STABILITY: FOOD INSECURITY: WITHIN THE PAST 12 MONTHS, THE FOOD YOU BOUGHT JUST DIDN'T LAST AND YOU DIDN'T HAVE MONEY TO GET MORE.: NEVER TRUE

## 2025-01-29 SDOH — ECONOMIC STABILITY: FOOD INSECURITY: WITHIN THE PAST 12 MONTHS, YOU WORRIED THAT YOUR FOOD WOULD RUN OUT BEFORE YOU GOT MONEY TO BUY MORE.: NEVER TRUE

## 2025-01-29 NOTE — PROGRESS NOTES
[facility]  2025    Shilpa Apple (:  1955) is a 69 y.o. female, here for evaluation of the following medical concerns:    Chief Complaint   Patient presents with    Hypertension     Pt is here for a follow up         ASSESSMENT/ PLAN  Assessment & Plan  Polyneuropathy   Chronic, at goal (stable), continue current treatment plan    Orders:    gabapentin (NEURONTIN) 100 MG capsule; Take 1 capsule by mouth daily for 180 days.    gabapentin (NEURONTIN) 400 MG capsule; TAKE 1 CAPSULE AT          BREAKFAST, AT LUNCH, AT    DINNER, AND 2 CAPSULES AT  BEDTIME    CBC with Auto Differential; Future    Comprehensive Metabolic Panel; Future    Vitamin D 25 Hydroxy; Future    Essential hypertension   Chronic, at goal (stable), continue current treatment plan    Orders:    losartan (COZAAR) 50 MG tablet; Take 1 tablet by mouth daily    CBC with Auto Differential; Future    Comprehensive Metabolic Panel; Future    Vitamin D 25 Hydroxy; Future    Vitamin D deficiency   Chronic, at goal (stable), continue current treatment plan    Orders:    CBC with Auto Differential; Future    Comprehensive Metabolic Panel; Future    Vitamin D 25 Hydroxy; Future    Chondrocalcinosis of left knee   Chronic, at goal (stable), continue current treatment plan    Orders:    CBC with Auto Differential; Future    Comprehensive Metabolic Panel; Future    Vitamin D 25 Hydroxy; Future    Generalized osteoarthritis   Chronic, at goal (stable), continue current treatment plan    Orders:    CBC with Auto Differential; Future    Comprehensive Metabolic Panel; Future    Vitamin D 25 Hydroxy; Future    Mixed hyperlipidemia   Chronic, at goal (stable), continue current treatment plan    Orders:    CBC with Auto Differential; Future    Comprehensive Metabolic Panel; Future    Vitamin D 25 Hydroxy; Future         No follow-ups on file.    HPI  Shilpa Apple is here for follow-up on hypertension, polyneuropathy, vitamin

## 2025-01-29 NOTE — ASSESSMENT & PLAN NOTE
Chronic, at goal (stable), continue current treatment plan    Orders:    losartan (COZAAR) 50 MG tablet; Take 1 tablet by mouth daily    CBC with Auto Differential; Future    Comprehensive Metabolic Panel; Future    Vitamin D 25 Hydroxy; Future

## 2025-01-29 NOTE — ASSESSMENT & PLAN NOTE
Chronic, at goal (stable), continue current treatment plan    Orders:    CBC with Auto Differential; Future    Comprehensive Metabolic Panel; Future    Vitamin D 25 Hydroxy; Future

## 2025-01-29 NOTE — ASSESSMENT & PLAN NOTE
Chronic, at goal (stable), continue current treatment plan    Orders:    gabapentin (NEURONTIN) 100 MG capsule; Take 1 capsule by mouth daily for 180 days.    gabapentin (NEURONTIN) 400 MG capsule; TAKE 1 CAPSULE AT          BREAKFAST, AT LUNCH, AT    DINNER, AND 2 CAPSULES AT  BEDTIME    CBC with Auto Differential; Future    Comprehensive Metabolic Panel; Future    Vitamin D 25 Hydroxy; Future

## 2025-01-30 DIAGNOSIS — G62.9 POLYNEUROPATHY: ICD-10-CM

## 2025-01-30 DIAGNOSIS — E78.2 MIXED HYPERLIPIDEMIA: Primary | ICD-10-CM

## 2025-01-30 RX ORDER — GABAPENTIN 100 MG/1
100 CAPSULE ORAL DAILY
Qty: 90 CAPSULE | Refills: 1 | Status: SHIPPED | OUTPATIENT
Start: 2025-01-30 | End: 2025-07-29

## 2025-01-30 RX ORDER — ATORVASTATIN CALCIUM 20 MG/1
20 TABLET, FILM COATED ORAL NIGHTLY
Qty: 90 TABLET | Refills: 1 | Status: SHIPPED | OUTPATIENT
Start: 2025-01-30

## 2025-01-31 ENCOUNTER — NURSE ONLY (OUTPATIENT)
Dept: FAMILY MEDICINE CLINIC | Age: 70
End: 2025-01-31

## 2025-01-31 DIAGNOSIS — R73.9 HYPERGLYCEMIA: ICD-10-CM

## 2025-01-31 DIAGNOSIS — R73.9 HYPERGLYCEMIA: Primary | ICD-10-CM

## 2025-01-31 LAB
EST. AVERAGE GLUCOSE BLD GHB EST-MCNC: 99.7 MG/DL
HBA1C MFR BLD: 5.1 %

## 2025-05-09 ENCOUNTER — HOSPITAL ENCOUNTER (OUTPATIENT)
Dept: WOMENS IMAGING | Age: 70
Discharge: HOME | End: 2025-05-09
Payer: MEDICARE

## 2025-05-09 VITALS — HEIGHT: 70 IN | WEIGHT: 210 LBS | BODY MASS INDEX: 30.06 KG/M2

## 2025-05-09 DIAGNOSIS — R92.8 ABNORMAL MAMMOGRAM: ICD-10-CM

## 2025-05-09 PROCEDURE — G0279 TOMOSYNTHESIS, MAMMO: HCPCS

## 2025-05-09 PROCEDURE — 76642 ULTRASOUND BREAST LIMITED: CPT

## 2025-05-12 ENCOUNTER — RESULTS FOLLOW-UP (OUTPATIENT)
Dept: FAMILY MEDICINE CLINIC | Age: 70
End: 2025-05-12

## 2025-06-15 SDOH — HEALTH STABILITY: PHYSICAL HEALTH: ON AVERAGE, HOW MANY MINUTES DO YOU ENGAGE IN EXERCISE AT THIS LEVEL?: 30 MIN

## 2025-06-15 SDOH — HEALTH STABILITY: PHYSICAL HEALTH: ON AVERAGE, HOW MANY DAYS PER WEEK DO YOU ENGAGE IN MODERATE TO STRENUOUS EXERCISE (LIKE A BRISK WALK)?: 6 DAYS

## 2025-06-18 ENCOUNTER — OFFICE VISIT (OUTPATIENT)
Dept: FAMILY MEDICINE CLINIC | Age: 70
End: 2025-06-18

## 2025-06-18 VITALS
DIASTOLIC BLOOD PRESSURE: 74 MMHG | OXYGEN SATURATION: 98 % | HEART RATE: 92 BPM | WEIGHT: 207 LBS | SYSTOLIC BLOOD PRESSURE: 132 MMHG | BODY MASS INDEX: 29.63 KG/M2 | HEIGHT: 70 IN

## 2025-06-18 DIAGNOSIS — G62.9 POLYNEUROPATHY: ICD-10-CM

## 2025-06-18 DIAGNOSIS — E78.2 MIXED HYPERLIPIDEMIA: ICD-10-CM

## 2025-06-18 DIAGNOSIS — M25.552 PAIN OF LEFT HIP: ICD-10-CM

## 2025-06-18 DIAGNOSIS — I10 ESSENTIAL HYPERTENSION: ICD-10-CM

## 2025-06-18 DIAGNOSIS — R26.89 IMPAIRMENT OF BALANCE: Primary | ICD-10-CM

## 2025-06-18 DIAGNOSIS — M54.50 RECURRENT LOW BACK PAIN: ICD-10-CM

## 2025-06-18 NOTE — ASSESSMENT & PLAN NOTE
Discussed physical therapy for gait and balance declines at this time received some exercises for strengthening and balance will initiate these at home.  Discussed safety of driving patient drives short distances only is able to manually move right leg quickly during exam from in front of her body to left of her body denies inability to maneuver car no concerns for safety

## 2025-06-18 NOTE — PROGRESS NOTES
PROGRESS NOTE  Date of Service:  6/18/2025    SUBJECTIVE:  Patient ID: Shilpa Apple is a 69 y.o. female    HPI: new patient exam  History of Present Illness  The patient is a 69-year-old female who presents for the establishment of care, a former patient of Dr. Palomares.    She maintains a balanced diet, inclusive of all food groups, and consumes minimal caffeine, limited to a daily cup of coffee and occasional tea on weekends. She engages in regular exercise, including stationary cycling for 30 minutes daily, covering distances between 6.3 to 7 miles. However, she does not participate in any balance training exercises. She reports good oral hygiene, brushing her teeth daily. She has recently updated her eyeglasses following an eye examination in the summer of 2024. She reports no changes in skin moles and is under the care of a dermatologist. She has a history of arthritis and blood clots. She undergoes annual mammograms, with the most recent one conducted in January 2025, which included biopsies that returned normal results. She has regular bowel movements without any blood in the stool and is up-to-date with her colonoscopy schedule. She does not consult with a cardiologist. She reports no dizziness or falls but experiences hip pain due to osteoarthritis. She reports no leg cramps from the atorvastatin. She reports no shortness of breath, chest pain at rest, headaches, changes in vision, or cognitive changes. She reports no major concerns related to blood clots. She has supportive family around her, her son and daughter. Her  passed away in July 2024. She reports satisfactory hearing and no safety concerns related to falls. 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 She has not undergone physical therapy for gait, core strengthening, or vestibular balance but is interested in trying home exercises first.

## 2025-08-18 DIAGNOSIS — E78.2 MIXED HYPERLIPIDEMIA: ICD-10-CM

## 2025-08-18 DIAGNOSIS — I10 ESSENTIAL HYPERTENSION: ICD-10-CM

## 2025-08-18 RX ORDER — ATORVASTATIN CALCIUM 20 MG/1
20 TABLET, FILM COATED ORAL NIGHTLY
Qty: 90 TABLET | Refills: 1 | Status: SHIPPED | OUTPATIENT
Start: 2025-08-18

## 2025-08-18 RX ORDER — LOSARTAN POTASSIUM 50 MG/1
50 TABLET ORAL DAILY
Qty: 90 TABLET | Refills: 1 | Status: SHIPPED | OUTPATIENT
Start: 2025-08-18

## (undated) DEVICE — MARKER SKIN MINI PUR TRAD INK FINE REGULAR TIP SCRB RESIST

## (undated) DEVICE — CRADLE POS 3X5X24IN RASPBERRY ARM PRONE FOAM DISP

## (undated) DEVICE — PACK PROCEDURE SURG ORAL CDS

## (undated) DEVICE — SUTURE MCRYL + SZ 4-0 L18IN ABSRB UD L19MM PS-2 3/8 CIR MCP496G

## (undated) DEVICE — SUTURE PERMAHAND SZ 2-0 L30IN NONABSORBABLE BLK SILK W/O A305H

## (undated) DEVICE — TRAY PREP DRY W/ PREM GLV 2 APPL 6 SPNG 2 UNDPD 1 OVERWRAP

## (undated) DEVICE — GLOVE SURG SZ 6 THK91MIL LTX FREE SYN POLYISOPRENE ANTI

## (undated) DEVICE — DRAPE,INSTRUMENT,MAGNETIC,10X16: Brand: MEDLINE

## (undated) DEVICE — HOOK RETRCT 12MM E 2 FNGR FOR LONE STAR RETRCT SYS

## (undated) DEVICE — PROBE 8225101 5PK STD PRASS FL TIP ROHS

## (undated) DEVICE — AGENT HEMSTAT 8ML FLX TIP MTRX + DISP SURGIFLO

## (undated) DEVICE — HOOK RETRCT DIA5MM SHRP E STAY DISP FOR LONE STAR SELF RET

## (undated) DEVICE — PENCIL ES CRD L10FT HND SWCHING ROCK SWCH W/ EDGE COAT BLDE

## (undated) DEVICE — Device

## (undated) DEVICE — NEEDLE HYPO 18GA L1.5IN PNK POLYPR HUB S STL REG BVL STR

## (undated) DEVICE — CORD ES L12FT BPLR FRCP

## (undated) DEVICE — GOWN,SIRUS,POLYRNF,BRTHSLV,XL,30/CS: Brand: MEDLINE

## (undated) DEVICE — E-Z CLEAN, NON-STICK, PTFE COATED, ELECTROSURGICAL BLADE ELECTRODE, MODIFIED EXTENDED INSULATION, 2.5 INCH (6.35 CM): Brand: MEGADYNE

## (undated) DEVICE — SUTURE PERMAHAND SZ 3-0 L30IN NONABSORBABLE BLK SILK BRAID A304H

## (undated) DEVICE — SHEARS ENDOSCP L9CM CRV HARM FOCS +

## (undated) DEVICE — SUTURE NONABSORBABLE MONOFILAMENT 5-0 PS-2 18 IN BLK ETHILON 1666H

## (undated) DEVICE — EMG TUBE 8229707 NIM TRIVANTAGE 7.0MM ID: Brand: NIM TRIVANTAGE™

## (undated) DEVICE — APPLICATOR ENDOSCP L34CM W/ S STL CANN PLAS OBT STYL FOR

## (undated) DEVICE — SPONGE SURG WHT KTNR DISECT RADPQ ST

## (undated) DEVICE — SPONGE,DISSECTOR,K,XRAY,9/16"X1/4",STRL: Brand: MEDLINE

## (undated) DEVICE — SUTURE VCRL + SZ 3-0 L18IN ABSRB UD SH 1/2 CIR TAPERCUT NDL VCP864D